# Patient Record
Sex: MALE | Race: BLACK OR AFRICAN AMERICAN | NOT HISPANIC OR LATINO | Employment: FULL TIME | URBAN - METROPOLITAN AREA
[De-identification: names, ages, dates, MRNs, and addresses within clinical notes are randomized per-mention and may not be internally consistent; named-entity substitution may affect disease eponyms.]

---

## 2019-07-29 RX ORDER — HYDROCHLOROTHIAZIDE 25 MG/1
TABLET ORAL
Qty: 90 TABLET | Refills: 1 | Status: SHIPPED | OUTPATIENT
Start: 2019-07-29 | End: 2020-02-06 | Stop reason: SDUPTHER

## 2019-08-26 PROBLEM — R00.1 SINUS BRADYCARDIA: Status: ACTIVE | Noted: 2018-08-28

## 2019-08-26 PROBLEM — I10 HTN (HYPERTENSION): Status: ACTIVE | Noted: 2019-08-26

## 2019-08-26 PROBLEM — R55 NEAR SYNCOPE: Status: ACTIVE | Noted: 2018-08-28

## 2019-12-02 ENCOUNTER — OFFICE VISIT (OUTPATIENT)
Dept: FAMILY MEDICINE CLINIC | Facility: CLINIC | Age: 59
End: 2019-12-02

## 2019-12-02 VITALS
DIASTOLIC BLOOD PRESSURE: 78 MMHG | WEIGHT: 184 LBS | BODY MASS INDEX: 27.89 KG/M2 | SYSTOLIC BLOOD PRESSURE: 123 MMHG | OXYGEN SATURATION: 98 % | HEIGHT: 68 IN | HEART RATE: 62 BPM

## 2019-12-02 DIAGNOSIS — S39.012A STRAIN OF LUMBAR REGION, INITIAL ENCOUNTER: Primary | ICD-10-CM

## 2019-12-02 PROBLEM — M62.830 MUSCLE SPASM OF BACK: Status: ACTIVE | Noted: 2019-12-02

## 2019-12-02 PROCEDURE — 99213 OFFICE O/P EST LOW 20 MIN: CPT | Performed by: NURSE PRACTITIONER

## 2019-12-02 RX ORDER — INFLUENZA A VIRUS A/SINGAPORE/GP1908/2015 IVR-180A (H1N1) ANTIGEN (PROPIOLACTONE INACTIVATED), INFLUENZA A VIRUS A/SINGAPORE/INFIMH-16-0019/2016 IVR-186 (H3N2) ANTIGEN (PROPIOLACTONE INACTIVATED), INFLUENZA B VIRUS B/MARYLAND/15/2016 ANTIGEN (PROPIOLACTONE INACTIVATED), AND INFLUENZA B VIRUS B/PHUKET/3073/2013 BVR-1B ANTIGEN (PROPIOLACTONE INACTIVATED) 15; 15; 15; 15 UG/.5ML; UG/.5ML; UG/.5ML; UG/.5ML
INJECTION, SUSPENSION INTRAMUSCULAR
Refills: 0 | COMMUNITY
Start: 2019-10-21 | End: 2021-02-25

## 2019-12-02 RX ORDER — IBUPROFEN 800 MG/1
800 TABLET ORAL EVERY 6 HOURS PRN
Qty: 90 TABLET | Refills: 0 | Status: SHIPPED | OUTPATIENT
Start: 2019-12-02 | End: 2021-02-25

## 2019-12-02 RX ORDER — CYCLOBENZAPRINE HCL 5 MG
5 TABLET ORAL 3 TIMES DAILY PRN
Qty: 20 TABLET | Refills: 0 | Status: SHIPPED | OUTPATIENT
Start: 2019-12-02 | End: 2021-02-25

## 2019-12-02 NOTE — PROGRESS NOTES
"  Sen Garcia is a 59 y.o. male.     Chief Complaint   Patient presents with   • lump on back     mid back       History of Present Illness pt presents for eval of L low back knot within the muscle, described as a marielena horse that happens at night intermittently for the last couple of months, 1-2 times/mo. Denies injury, tries to use proper body mechanics at work d/t an old lumbar/sacral injury, but reports this pain is different and not within the spine. Denies constipation, dysuria or hematuria      Subjective     Visit Vitals  /78 (BP Location: Left arm, Patient Position: Sitting, Cuff Size: Large Adult)   Pulse 62   Ht 172.7 cm (68\")   Wt 83.5 kg (184 lb)   SpO2 98%   BMI 27.98 kg/m²       The following portions of the patient's history were reviewed and updated as appropriate: allergies, current medications, past family history, past medical history, past social history, past surgical history and problem list.    Review of Systems   Musculoskeletal: Positive for back pain (knot to the left of low back in the muscle ).   All other systems reviewed and are negative.      Objective     Physical Exam   Constitutional: He is oriented to person, place, and time. He appears well-developed and well-nourished.   HENT:   Head: Normocephalic.   Pulmonary/Chest: Effort normal.   Abdominal: Soft.   Musculoskeletal: Normal range of motion. He exhibits tenderness (firm musculature and ttp in lat. dorsi left lateral to spine ).   Neurological: He is alert and oriented to person, place, and time.   Skin: Skin is warm and dry.   Psychiatric: He has a normal mood and affect. His behavior is normal.         Assessment/Plan   Sen was seen today for lump on back.    Diagnoses and all orders for this visit:    Strain of lumbar region, initial encounter    Other orders  -     ibuprofen (ADVIL,MOTRIN) 800 MG tablet; Take 1 tablet by mouth Every 6 (Six) Hours As Needed for Mild Pain .  -     cyclobenzaprine (FLEXERIL) 5 " MG tablet; Take 1 tablet by mouth 3 (Three) Times a Day As Needed for Muscle Spasms.    try ibuprofen, flexeril at hs, biofreeze, massage and ice to the left lat dorsi, given exercises/stretches to start as well. Return in 2-3 weeks if s/s wonb.                Glucose   Date Value Ref Range Status   07/10/2018 89 65 - 99 mg/dL Final     BUN   Date Value Ref Range Status   07/10/2018 13 8 - 20 mg/dL Final     Creatinine   Date Value Ref Range Status   07/10/2018 1.1 0.7 - 1.2 mg/dl Final     Sodium   Date Value Ref Range Status   07/10/2018 140 136 - 144 mmol/L Final     Potassium   Date Value Ref Range Status   07/10/2018 3.9 3.6 - 5.1 mmol/L Final     Chloride   Date Value Ref Range Status   07/10/2018 108 101 - 111 mmol/L Final     CO2   Date Value Ref Range Status   07/10/2018 28 22 - 32 mmol/L Final     Calcium   Date Value Ref Range Status   07/10/2018 8.9 8.9 - 10.3 mg/dL Final     BUN/Creatinine Ratio   Date Value Ref Range Status   07/10/2018 11.8 6.2 - 20.3 Final     Anion Gap   Date Value Ref Range Status   07/10/2018 7.9 (L) 10 - 20 Final

## 2019-12-02 NOTE — PATIENT INSTRUCTIONS
Back Exercises  The following exercises strengthen the muscles that help to support the back. They also help to keep the lower back flexible. Doing these exercises can help to prevent back pain or lessen existing pain.  If you have back pain or discomfort, try doing these exercises 2-3 times each day or as told by your health care provider. When the pain goes away, do them once each day, but increase the number of times that you repeat the steps for each exercise (do more repetitions). If you do not have back pain or discomfort, do these exercises once each day or as told by your health care provider.  Exercises  Single Knee to Chest  Repeat these steps 3-5 times for each le. Lie on your back on a firm bed or the floor with your legs extended.  2. Bring one knee to your chest. Your other leg should stay extended and in contact with the floor.  3. Hold your knee in place by grabbing your knee or thigh.  4. Pull on your knee until you feel a gentle stretch in your lower back.  5. Hold the stretch for 10-30 seconds.  6. Slowly release and straighten your leg.  Pelvic Tilt  Repeat these steps 5-10 times:  1. Lie on your back on a firm bed or the floor with your legs extended.  2. Bend your knees so they are pointing toward the ceiling and your feet are flat on the floor.  3. Tighten your lower abdominal muscles to press your lower back against the floor. This motion will tilt your pelvis so your tailbone points up toward the ceiling instead of pointing to your feet or the floor.  4. With gentle tension and even breathing, hold this position for 5-10 seconds.  Cat-Cow  Repeat these steps until your lower back becomes more flexible:  1. Get into a hands-and-knees position on a firm surface. Keep your hands under your shoulders, and keep your knees under your hips. You may place padding under your knees for comfort.  2. Let your head hang down, and point your tailbone toward the floor so your lower back becomes  rounded like the back of a cat.  3. Hold this position for 5 seconds.  4. Slowly lift your head and point your tailbone up toward the ceiling so your back forms a sagging arch like the back of a cow.  5. Hold this position for 5 seconds.    Press-Ups  Repeat these steps 5-10 times:  1. Lie on your abdomen (face-down) on the floor.  2. Place your palms near your head, about shoulder-width apart.  3. While you keep your back as relaxed as possible and keep your hips on the floor, slowly straighten your arms to raise the top half of your body and lift your shoulders. Do not use your back muscles to raise your upper torso. You may adjust the placement of your hands to make yourself more comfortable.  4. Hold this position for 5 seconds while you keep your back relaxed.  5. Slowly return to lying flat on the floor.    Bridges  Repeat these steps 10 times:  1. Lie on your back on a firm surface.  2. Bend your knees so they are pointing toward the ceiling and your feet are flat on the floor.  3. Tighten your buttocks muscles and lift your buttocks off of the floor until your waist is at almost the same height as your knees. You should feel the muscles working in your buttocks and the back of your thighs. If you do not feel these muscles, slide your feet 1-2 inches farther away from your buttocks.  4. Hold this position for 3-5 seconds.  5. Slowly lower your hips to the starting position, and allow your buttocks muscles to relax completely.  If this exercise is too easy, try doing it with your arms crossed over your chest.  Abdominal Crunches  Repeat these steps 5-10 times:  1. Lie on your back on a firm bed or the floor with your legs extended.  2. Bend your knees so they are pointing toward the ceiling and your feet are flat on the floor.  3. Cross your arms over your chest.  4. Tip your chin slightly toward your chest without bending your neck.  5. Tighten your abdominal muscles and slowly raise your trunk (torso) high  enough to lift your shoulder blades a tiny bit off of the floor. Avoid raising your torso higher than that, because it can put too much stress on your low back and it does not help to strengthen your abdominal muscles.  6. Slowly return to your starting position.  Back Lifts  Repeat these steps 5-10 times:  1. Lie on your abdomen (face-down) with your arms at your sides, and rest your forehead on the floor.  2. Tighten the muscles in your legs and your buttocks.  3. Slowly lift your chest off of the floor while you keep your hips pressed to the floor. Keep the back of your head in line with the curve in your back. Your eyes should be looking at the floor.  4. Hold this position for 3-5 seconds.  5. Slowly return to your starting position.  Contact a health care provider if:  · Your back pain or discomfort gets much worse when you do an exercise.  · Your back pain or discomfort does not lessen within 2 hours after you exercise.  If you have any of these problems, stop doing these exercises right away. Do not do them again unless your health care provider says that you can.  Get help right away if:  · You develop sudden, severe back pain. If this happens, stop doing the exercises right away. Do not do them again unless your health care provider says that you can.  This information is not intended to replace advice given to you by your health care provider. Make sure you discuss any questions you have with your health care provider.  Document Released: 01/25/2006 Document Revised: 04/23/2019 Document Reviewed: 02/11/2016  [employer] Patient Education © 2019 [employer]

## 2020-02-05 ENCOUNTER — TELEPHONE (OUTPATIENT)
Dept: FAMILY MEDICINE CLINIC | Facility: CLINIC | Age: 60
End: 2020-02-05

## 2020-02-05 NOTE — TELEPHONE ENCOUNTER
Patient came in and asked that you send his script of Hydrochlorathiazide to [facility]'s on [address]. The cost through mail order has went from under 3.00 to nearly 16.00 for a 90 day supply.

## 2020-02-06 RX ORDER — HYDROCHLOROTHIAZIDE 25 MG/1
25 TABLET ORAL DAILY
Qty: 90 TABLET | Refills: 1 | Status: SHIPPED | OUTPATIENT
Start: 2020-02-06 | End: 2020-09-29

## 2020-09-29 RX ORDER — HYDROCHLOROTHIAZIDE 25 MG/1
25 TABLET ORAL DAILY
Qty: 30 TABLET | Refills: 0 | Status: SHIPPED | OUTPATIENT
Start: 2020-09-29 | End: 2020-11-06

## 2020-11-06 RX ORDER — HYDROCHLOROTHIAZIDE 25 MG/1
25 TABLET ORAL DAILY
Qty: 30 TABLET | Refills: 0 | Status: SHIPPED | OUTPATIENT
Start: 2020-11-06 | End: 2023-03-29

## 2020-12-18 RX ORDER — HYDROCHLOROTHIAZIDE 25 MG/1
25 TABLET ORAL DAILY
Qty: 30 TABLET | Refills: 0 | OUTPATIENT
Start: 2020-12-18

## 2021-02-25 PROCEDURE — U0003 INFECTIOUS AGENT DETECTION BY NUCLEIC ACID (DNA OR RNA); SEVERE ACUTE RESPIRATORY SYNDROME CORONAVIRUS 2 (SARS-COV-2) (CORONAVIRUS DISEASE [COVID-19]), AMPLIFIED PROBE TECHNIQUE, MAKING USE OF HIGH THROUGHPUT TECHNOLOGIES AS DESCRIBED BY CMS-2020-01-R: HCPCS | Performed by: NURSE PRACTITIONER

## 2021-02-28 ENCOUNTER — HOSPITAL ENCOUNTER (EMERGENCY)
Facility: HOSPITAL | Age: 61
Discharge: HOME | End: 2021-02-28
Attending: EMERGENCY MEDICINE | Admitting: EMERGENCY MEDICINE

## 2021-02-28 ENCOUNTER — APPOINTMENT (OUTPATIENT)
Dept: CT IMAGING | Facility: HOSPITAL | Age: 61
End: 2021-02-28

## 2021-02-28 VITALS
HEART RATE: 64 BPM | SYSTOLIC BLOOD PRESSURE: 115 MMHG | BODY MASS INDEX: 28.37 KG/M2 | RESPIRATION RATE: 16 BRPM | WEIGHT: 180.78 LBS | OXYGEN SATURATION: 98 % | HEIGHT: 67 IN | TEMPERATURE: 98.4 F | DIASTOLIC BLOOD PRESSURE: 68 MMHG

## 2021-02-28 DIAGNOSIS — K57.92 ACUTE DIVERTICULITIS: ICD-10-CM

## 2021-02-28 DIAGNOSIS — R10.84 GENERALIZED ABDOMINAL PAIN: Primary | ICD-10-CM

## 2021-02-28 LAB
ANION GAP SERPL CALCULATED.3IONS-SCNC: 10 MMOL/L (ref 5–15)
BACTERIA UR QL AUTO: ABNORMAL /HPF
BASOPHILS # BLD AUTO: 0 10*3/MM3 (ref 0–0.2)
BASOPHILS NFR BLD AUTO: 0.6 % (ref 0–1.5)
BILIRUB UR QL STRIP: NEGATIVE
BUN SERPL-MCNC: 12 MG/DL (ref 6–20)
BUN/CREAT SERPL: 10.1 (ref 7–25)
CALCIUM SPEC-SCNC: 8.9 MG/DL (ref 8.6–10.5)
CHLORIDE SERPL-SCNC: 99 MMOL/L (ref 98–107)
CLARITY UR: CLEAR
CO2 SERPL-SCNC: 31 MMOL/L (ref 22–29)
COLOR UR: YELLOW
CREAT SERPL-MCNC: 1.19 MG/DL (ref 0.76–1.27)
DEPRECATED RDW RBC AUTO: 39.4 FL (ref 37–54)
EOSINOPHIL # BLD AUTO: 0 10*3/MM3 (ref 0–0.4)
EOSINOPHIL NFR BLD AUTO: 0.3 % (ref 0.3–6.2)
ERYTHROCYTE [DISTWIDTH] IN BLOOD BY AUTOMATED COUNT: 12.7 % (ref 12.3–15.4)
GFR SERPL CREATININE-BSD FRML MDRD: 76 ML/MIN/1.73
GLUCOSE SERPL-MCNC: 110 MG/DL (ref 65–99)
GLUCOSE UR STRIP-MCNC: NEGATIVE MG/DL
HCT VFR BLD AUTO: 43.4 % (ref 37.5–51)
HGB BLD-MCNC: 15.2 G/DL (ref 13–17.7)
HGB UR QL STRIP.AUTO: ABNORMAL
HOLD SPECIMEN: NORMAL
HOLD SPECIMEN: NORMAL
HYALINE CASTS UR QL AUTO: ABNORMAL /LPF
KETONES UR QL STRIP: ABNORMAL
LEUKOCYTE ESTERASE UR QL STRIP.AUTO: NEGATIVE
LYMPHOCYTES # BLD AUTO: 1.5 10*3/MM3 (ref 0.7–3.1)
LYMPHOCYTES NFR BLD AUTO: 27 % (ref 19.6–45.3)
MCH RBC QN AUTO: 31.1 PG (ref 26.6–33)
MCHC RBC AUTO-ENTMCNC: 35 G/DL (ref 31.5–35.7)
MCV RBC AUTO: 88.8 FL (ref 79–97)
MONOCYTES # BLD AUTO: 0.7 10*3/MM3 (ref 0.1–0.9)
MONOCYTES NFR BLD AUTO: 12 % (ref 5–12)
NEUTROPHILS NFR BLD AUTO: 3.4 10*3/MM3 (ref 1.7–7)
NEUTROPHILS NFR BLD AUTO: 60.1 % (ref 42.7–76)
NITRITE UR QL STRIP: NEGATIVE
NRBC BLD AUTO-RTO: 0.1 /100 WBC (ref 0–0.2)
PH UR STRIP.AUTO: 6.5 [PH] (ref 5–8)
PLATELET # BLD AUTO: 233 10*3/MM3 (ref 140–450)
PMV BLD AUTO: 8.8 FL (ref 6–12)
POTASSIUM SERPL-SCNC: 3.4 MMOL/L (ref 3.5–5.2)
PROT UR QL STRIP: ABNORMAL
RBC # BLD AUTO: 4.89 10*6/MM3 (ref 4.14–5.8)
RBC # UR: ABNORMAL /HPF
REF LAB TEST METHOD: ABNORMAL
SODIUM SERPL-SCNC: 140 MMOL/L (ref 136–145)
SP GR UR STRIP: 1.03 (ref 1–1.03)
SQUAMOUS #/AREA URNS HPF: ABNORMAL /HPF
UROBILINOGEN UR QL STRIP: ABNORMAL
WBC # BLD AUTO: 5.7 10*3/MM3 (ref 3.4–10.8)
WBC UR QL AUTO: ABNORMAL /HPF
WHOLE BLOOD HOLD SPECIMEN: NORMAL

## 2021-02-28 PROCEDURE — 81001 URINALYSIS AUTO W/SCOPE: CPT | Performed by: EMERGENCY MEDICINE

## 2021-02-28 PROCEDURE — 80048 BASIC METABOLIC PNL TOTAL CA: CPT | Performed by: EMERGENCY MEDICINE

## 2021-02-28 PROCEDURE — 74176 CT ABD & PELVIS W/O CONTRAST: CPT

## 2021-02-28 PROCEDURE — 99283 EMERGENCY DEPT VISIT LOW MDM: CPT

## 2021-02-28 PROCEDURE — 85025 COMPLETE CBC W/AUTO DIFF WBC: CPT | Performed by: EMERGENCY MEDICINE

## 2021-02-28 RX ORDER — SODIUM CHLORIDE 0.9 % (FLUSH) 0.9 %
10 SYRINGE (ML) INJECTION AS NEEDED
Status: DISCONTINUED | OUTPATIENT
Start: 2021-02-28 | End: 2021-02-28 | Stop reason: HOSPADM

## 2021-02-28 RX ORDER — AMOXICILLIN AND CLAVULANATE POTASSIUM 875; 125 MG/1; MG/1
1 TABLET, FILM COATED ORAL 2 TIMES DAILY
Qty: 14 TABLET | Refills: 0 | Status: SHIPPED | OUTPATIENT
Start: 2021-02-28 | End: 2023-03-29

## 2021-02-28 RX ORDER — NAPROXEN 375 MG/1
375 TABLET ORAL 2 TIMES DAILY PRN
Qty: 14 TABLET | Refills: 0 | Status: SHIPPED | OUTPATIENT
Start: 2021-02-28 | End: 2023-03-29

## 2021-03-01 NOTE — ED PROVIDER NOTES
Subjective   Patient is a 60-year-old male complaint of several day history of lower abdominal pain.  The pain is mild to moderate but constant.  He denies fever balm diarrhea dysuria or other complaint.          Review of Systems  Negative for headache earache sore throat cough fever chest pain shortness of breath vomiting diarrhea dysuria achiness weight loss or other complaint.  Past Medical History:   Diagnosis Date   • HTN (hypertension)    • Near syncope    • Sinus bradycardia        No Known Allergies    Past Surgical History:   Procedure Laterality Date   • HAND SURGERY Left    • KNEE ARTHROSCOPY Right        Family History   Problem Relation Age of Onset   • Diabetes Mother        Social History     Socioeconomic History   • Marital status:      Spouse name: Not on file   • Number of children: Not on file   • Years of education: Not on file   • Highest education level: Not on file   Tobacco Use   • Smoking status: Never Smoker   • Smokeless tobacco: Never Used   Substance and Sexual Activity   • Alcohol use: No     Frequency: Never   • Drug use: No   • Sexual activity: Defer           Objective   Physical Exam  HEENT exam shows TMs to be clear.  Oropharynx clear moist but sclera nonicteric.  Neck has no adenopathy JVD or bruits.  Lungs are clear.  Heart has regular rate rhythm without murmur gallop.  Chest is nontender.  Abdomen soft with minimal infraumbilical tenderness.  Patient has normal bowel sounds without rebound or guarding.  Back has no CVA tenderness.  Procedures           ED Course      Results for orders placed or performed during the hospital encounter of 02/28/21   Basic Metabolic Panel    Specimen: Blood   Result Value Ref Range    Glucose 110 (H) 65 - 99 mg/dL    BUN 12 6 - 20 mg/dL    Creatinine 1.19 0.76 - 1.27 mg/dL    Sodium 140 136 - 145 mmol/L    Potassium 3.4 (L) 3.5 - 5.2 mmol/L    Chloride 99 98 - 107 mmol/L    CO2 31.0 (H) 22.0 - 29.0 mmol/L    Calcium 8.9 8.6 - 10.5 mg/dL     eGFR  African Amer 76 >60 mL/min/1.73    BUN/Creatinine Ratio 10.1 7.0 - 25.0    Anion Gap 10.0 5.0 - 15.0 mmol/L   Urinalysis With Culture If Indicated - Urine, Clean Catch    Specimen: Urine, Clean Catch   Result Value Ref Range    Color, UA Yellow Yellow, Straw    Appearance, UA Clear Clear    pH, UA 6.5 5.0 - 8.0    Specific Gravity, UA 1.029 1.005 - 1.030    Glucose, UA Negative Negative    Ketones, UA Trace (A) Negative    Bilirubin, UA Negative Negative    Blood, UA Small (1+) (A) Negative    Protein, UA 30 mg/dL (1+) (A) Negative    Leuk Esterase, UA Negative Negative    Nitrite, UA Negative Negative    Urobilinogen, UA 1.0 E.U./dL 0.2 - 1.0 E.U./dL   CBC Auto Differential    Specimen: Blood   Result Value Ref Range    WBC 5.70 3.40 - 10.80 10*3/mm3    RBC 4.89 4.14 - 5.80 10*6/mm3    Hemoglobin 15.2 13.0 - 17.7 g/dL    Hematocrit 43.4 37.5 - 51.0 %    MCV 88.8 79.0 - 97.0 fL    MCH 31.1 26.6 - 33.0 pg    MCHC 35.0 31.5 - 35.7 g/dL    RDW 12.7 12.3 - 15.4 %    RDW-SD 39.4 37.0 - 54.0 fl    MPV 8.8 6.0 - 12.0 fL    Platelets 233 140 - 450 10*3/mm3    Neutrophil % 60.1 42.7 - 76.0 %    Lymphocyte % 27.0 19.6 - 45.3 %    Monocyte % 12.0 5.0 - 12.0 %    Eosinophil % 0.3 0.3 - 6.2 %    Basophil % 0.6 0.0 - 1.5 %    Neutrophils, Absolute 3.40 1.70 - 7.00 10*3/mm3    Lymphocytes, Absolute 1.50 0.70 - 3.10 10*3/mm3    Monocytes, Absolute 0.70 0.10 - 0.90 10*3/mm3    Eosinophils, Absolute 0.00 0.00 - 0.40 10*3/mm3    Basophils, Absolute 0.00 0.00 - 0.20 10*3/mm3    nRBC 0.1 0.0 - 0.2 /100 WBC   Urinalysis, Microscopic Only - Urine, Clean Catch    Specimen: Urine, Clean Catch   Result Value Ref Range    RBC, UA None Seen None Seen /HPF    WBC, UA 3-5 (A) None Seen /HPF    Bacteria, UA None Seen None Seen /HPF    Squamous Epithelial Cells, UA 3-6 (A) None Seen, 0-2 /HPF    Hyaline Casts, UA None Seen None Seen /LPF    Methodology Manual Light Microscopy    Light Blue Top   Result Value Ref Range    Extra Tube hold  for add-on    Gold Top - SST   Result Value Ref Range    Extra Tube Hold for add-ons.    Green Top (Gel)   Result Value Ref Range    Extra Tube Hold for add-ons.      Ct Abdomen Pelvis Without Contrast    Result Date: 2/28/2021  1.Patchy left lower lobe infiltrate consistent with pneumonia. 2.Fatty stranding around the proximal sigmoid colon where there are several prominent diverticula consistent with uncomplicated acute diverticulitis. Close follow-up recommended to ensure there is no underlying malignant process.    Electronically Signed By-Fortino Luo MD On:2/28/2021 7:54 PM This report was finalized on [id number] by  Fortino Luo MD.                                         MDM  Number of Diagnoses or Management Options  Diagnosis management comments: She has findings consistent with acute diverticulitis.  There is no evidence of other intra-abdominal abnormality.  Metabolic panel is at baseline.  There is no evidence of other infectious process.  Patient will be discharged.  Patient will be placed on Augmentin and Naprosyn.  We will see his MD for recheck.       Amount and/or Complexity of Data Reviewed  Clinical lab tests: reviewed  Tests in the radiology section of CPT®: reviewed    Risk of Complications, Morbidity, and/or Mortality  Presenting problems: high  Diagnostic procedures: high  Management options: high    Patient Progress  Patient progress: stable      Final diagnoses:   Generalized abdominal pain   Acute diverticulitis            Fortino Gutierrez MD  02/28/21 2014

## 2021-04-07 ENCOUNTER — HOSPITAL ENCOUNTER (OUTPATIENT)
Dept: CARDIOLOGY | Facility: HOSPITAL | Age: 61
Discharge: HOME | End: 2021-04-07

## 2021-04-07 ENCOUNTER — LAB (OUTPATIENT)
Dept: LAB | Facility: HOSPITAL | Age: 61
End: 2021-04-07

## 2021-04-07 ENCOUNTER — TRANSCRIBE ORDERS (OUTPATIENT)
Dept: ADMINISTRATIVE | Facility: HOSPITAL | Age: 61
End: 2021-04-07

## 2021-04-07 DIAGNOSIS — Z01.818 PREOP TESTING: ICD-10-CM

## 2021-04-07 DIAGNOSIS — D49.4 BLADDER TUMOR: ICD-10-CM

## 2021-04-07 DIAGNOSIS — D49.4 BLADDER TUMOR: Primary | ICD-10-CM

## 2021-04-07 LAB
ANION GAP SERPL CALCULATED.3IONS-SCNC: 8.9 MMOL/L (ref 5–15)
BASOPHILS # BLD AUTO: 0.05 10*3/MM3 (ref 0–0.2)
BASOPHILS NFR BLD AUTO: 1.2 % (ref 0–1.5)
BUN SERPL-MCNC: 12 MG/DL (ref 6–20)
BUN/CREAT SERPL: 11.2 (ref 7–25)
CALCIUM SPEC-SCNC: 9.3 MG/DL (ref 8.6–10.5)
CHLORIDE SERPL-SCNC: 102 MMOL/L (ref 98–107)
CO2 SERPL-SCNC: 30.1 MMOL/L (ref 22–29)
CREAT SERPL-MCNC: 1.07 MG/DL (ref 0.76–1.27)
DEPRECATED RDW RBC AUTO: 43.3 FL (ref 37–54)
EOSINOPHIL # BLD AUTO: 0.1 10*3/MM3 (ref 0–0.4)
EOSINOPHIL NFR BLD AUTO: 2.4 % (ref 0.3–6.2)
ERYTHROCYTE [DISTWIDTH] IN BLOOD BY AUTOMATED COUNT: 13.3 % (ref 12.3–15.4)
GFR SERPL CREATININE-BSD FRML MDRD: 86 ML/MIN/1.73
GLUCOSE SERPL-MCNC: 99 MG/DL (ref 65–99)
HCT VFR BLD AUTO: 40.7 % (ref 37.5–51)
HGB BLD-MCNC: 13.9 G/DL (ref 13–17.7)
IMM GRANULOCYTES # BLD AUTO: 0.01 10*3/MM3 (ref 0–0.05)
IMM GRANULOCYTES NFR BLD AUTO: 0.2 % (ref 0–0.5)
LYMPHOCYTES # BLD AUTO: 1.71 10*3/MM3 (ref 0.7–3.1)
LYMPHOCYTES NFR BLD AUTO: 40.8 % (ref 19.6–45.3)
MCH RBC QN AUTO: 30.4 PG (ref 26.6–33)
MCHC RBC AUTO-ENTMCNC: 34.2 G/DL (ref 31.5–35.7)
MCV RBC AUTO: 89.1 FL (ref 79–97)
MONOCYTES # BLD AUTO: 0.39 10*3/MM3 (ref 0.1–0.9)
MONOCYTES NFR BLD AUTO: 9.3 % (ref 5–12)
NEUTROPHILS NFR BLD AUTO: 1.93 10*3/MM3 (ref 1.7–7)
NEUTROPHILS NFR BLD AUTO: 46.1 % (ref 42.7–76)
NRBC BLD AUTO-RTO: 0 /100 WBC (ref 0–0.2)
PLATELET # BLD AUTO: 213 10*3/MM3 (ref 140–450)
PMV BLD AUTO: 12.4 FL (ref 6–12)
POTASSIUM SERPL-SCNC: 3.5 MMOL/L (ref 3.5–5.2)
RBC # BLD AUTO: 4.57 10*6/MM3 (ref 4.14–5.8)
SARS-COV-2 ORF1AB RESP QL NAA+PROBE: NOT DETECTED
SODIUM SERPL-SCNC: 141 MMOL/L (ref 136–145)
WBC # BLD AUTO: 4.19 10*3/MM3 (ref 3.4–10.8)

## 2021-04-07 PROCEDURE — 36415 COLL VENOUS BLD VENIPUNCTURE: CPT

## 2021-04-07 PROCEDURE — U0004 COV-19 TEST NON-CDC HGH THRU: HCPCS

## 2021-04-07 PROCEDURE — 93010 ELECTROCARDIOGRAM REPORT: CPT | Performed by: INTERNAL MEDICINE

## 2021-04-07 PROCEDURE — C9803 HOPD COVID-19 SPEC COLLECT: HCPCS

## 2021-04-07 PROCEDURE — 85025 COMPLETE CBC W/AUTO DIFF WBC: CPT

## 2021-04-07 PROCEDURE — 93005 ELECTROCARDIOGRAM TRACING: CPT | Performed by: UROLOGY

## 2021-04-07 PROCEDURE — 80048 BASIC METABOLIC PNL TOTAL CA: CPT

## 2021-04-13 PROCEDURE — 88305 TISSUE EXAM BY PATHOLOGIST: CPT | Performed by: UROLOGY

## 2021-04-14 ENCOUNTER — LAB REQUISITION (OUTPATIENT)
Dept: LAB | Facility: HOSPITAL | Age: 61
End: 2021-04-14

## 2021-04-14 DIAGNOSIS — N32.9 BLADDER DISORDER, UNSPECIFIED: ICD-10-CM

## 2021-04-15 LAB
LAB AP CASE REPORT: NORMAL
PATH REPORT.FINAL DX SPEC: NORMAL
PATH REPORT.GROSS SPEC: NORMAL

## 2021-04-16 LAB — QT INTERVAL: 409 MS

## 2021-04-28 ENCOUNTER — OFFICE (AMBULATORY)
Dept: URBAN - METROPOLITAN AREA CLINIC 64 | Facility: CLINIC | Age: 61
End: 2021-04-28
Payer: COMMERCIAL

## 2021-04-28 VITALS
SYSTOLIC BLOOD PRESSURE: 115 MMHG | WEIGHT: 180 LBS | HEART RATE: 58 BPM | DIASTOLIC BLOOD PRESSURE: 61 MMHG | HEIGHT: 67 IN

## 2021-04-28 DIAGNOSIS — K57.32 DIVERTICULITIS OF LARGE INTESTINE WITHOUT PERFORATION OR ABS: ICD-10-CM

## 2021-04-28 PROCEDURE — 99204 OFFICE O/P NEW MOD 45 MIN: CPT | Performed by: INTERNAL MEDICINE

## 2021-04-28 RX ORDER — POLYETHYLENE GLYCOL 3350 17 G/17G
POWDER, FOR SOLUTION ORAL
Qty: 3 | Refills: 3 | Status: COMPLETED
Start: 2021-04-28 | End: 2021-06-08

## 2021-06-09 ENCOUNTER — ON CAMPUS - OUTPATIENT (AMBULATORY)
Dept: URBAN - METROPOLITAN AREA HOSPITAL 2 | Facility: HOSPITAL | Age: 61
End: 2021-06-09

## 2021-06-09 VITALS
HEART RATE: 64 BPM | DIASTOLIC BLOOD PRESSURE: 54 MMHG | SYSTOLIC BLOOD PRESSURE: 125 MMHG | HEIGHT: 67 IN | SYSTOLIC BLOOD PRESSURE: 98 MMHG | HEART RATE: 66 BPM | HEART RATE: 54 BPM | DIASTOLIC BLOOD PRESSURE: 61 MMHG | RESPIRATION RATE: 18 BRPM | DIASTOLIC BLOOD PRESSURE: 98 MMHG | SYSTOLIC BLOOD PRESSURE: 103 MMHG | DIASTOLIC BLOOD PRESSURE: 74 MMHG | SYSTOLIC BLOOD PRESSURE: 130 MMHG | OXYGEN SATURATION: 100 % | DIASTOLIC BLOOD PRESSURE: 62 MMHG | HEART RATE: 65 BPM | HEART RATE: 47 BPM | SYSTOLIC BLOOD PRESSURE: 95 MMHG | DIASTOLIC BLOOD PRESSURE: 72 MMHG | SYSTOLIC BLOOD PRESSURE: 129 MMHG | SYSTOLIC BLOOD PRESSURE: 113 MMHG | RESPIRATION RATE: 16 BRPM | SYSTOLIC BLOOD PRESSURE: 154 MMHG | WEIGHT: 174 LBS | DIASTOLIC BLOOD PRESSURE: 79 MMHG | TEMPERATURE: 97.7 F | HEART RATE: 52 BPM

## 2021-06-09 DIAGNOSIS — K57.92 DIVERTICULITIS OF INTESTINE, PART UNSPECIFIED, WITHOUT PERFO: ICD-10-CM

## 2021-06-09 DIAGNOSIS — R10.32 LEFT LOWER QUADRANT PAIN: ICD-10-CM

## 2021-06-09 DIAGNOSIS — K57.30 DIVERTICULOSIS OF LARGE INTESTINE WITHOUT PERFORATION OR ABS: ICD-10-CM

## 2021-06-09 DIAGNOSIS — K64.1 SECOND DEGREE HEMORRHOIDS: ICD-10-CM

## 2021-06-09 PROCEDURE — 45378 DIAGNOSTIC COLONOSCOPY: CPT | Mod: 33 | Performed by: INTERNAL MEDICINE

## 2022-05-17 ENCOUNTER — LAB (OUTPATIENT)
Dept: LAB | Facility: HOSPITAL | Age: 62
End: 2022-05-17

## 2022-05-17 ENCOUNTER — TRANSCRIBE ORDERS (OUTPATIENT)
Dept: ADMINISTRATIVE | Facility: HOSPITAL | Age: 62
End: 2022-05-17

## 2022-05-17 ENCOUNTER — HOSPITAL ENCOUNTER (OUTPATIENT)
Dept: CARDIOLOGY | Facility: HOSPITAL | Age: 62
Discharge: HOME | End: 2022-05-17

## 2022-05-17 DIAGNOSIS — Z01.818 PRE-OP TESTING: ICD-10-CM

## 2022-05-17 DIAGNOSIS — Z01.818 PRE-OP TESTING: Primary | ICD-10-CM

## 2022-05-17 LAB
ANION GAP SERPL CALCULATED.3IONS-SCNC: 9.2 MMOL/L (ref 5–15)
BASOPHILS # BLD AUTO: 0.02 10*3/MM3 (ref 0–0.2)
BASOPHILS NFR BLD AUTO: 0.5 % (ref 0–1.5)
BUN SERPL-MCNC: 11 MG/DL (ref 6–20)
BUN/CREAT SERPL: 10.6 (ref 7–25)
CALCIUM SPEC-SCNC: 9.5 MG/DL (ref 8.6–10.5)
CHLORIDE SERPL-SCNC: 101 MMOL/L (ref 98–107)
CO2 SERPL-SCNC: 29.8 MMOL/L (ref 22–29)
CREAT SERPL-MCNC: 1.04 MG/DL (ref 0.76–1.27)
DEPRECATED RDW RBC AUTO: 38.8 FL (ref 37–54)
EGFRCR SERPLBLD CKD-EPI 2021: 82.7 ML/MIN/1.73
EOSINOPHIL # BLD AUTO: 0.05 10*3/MM3 (ref 0–0.4)
EOSINOPHIL NFR BLD AUTO: 1.1 % (ref 0.3–6.2)
ERYTHROCYTE [DISTWIDTH] IN BLOOD BY AUTOMATED COUNT: 12.5 % (ref 12.3–15.4)
GLUCOSE SERPL-MCNC: 86 MG/DL (ref 65–99)
HCT VFR BLD AUTO: 39.2 % (ref 37.5–51)
HGB BLD-MCNC: 13.7 G/DL (ref 13–17.7)
IMM GRANULOCYTES # BLD AUTO: 0.02 10*3/MM3 (ref 0–0.05)
IMM GRANULOCYTES NFR BLD AUTO: 0.5 % (ref 0–0.5)
LYMPHOCYTES # BLD AUTO: 1.71 10*3/MM3 (ref 0.7–3.1)
LYMPHOCYTES NFR BLD AUTO: 39 % (ref 19.6–45.3)
MCH RBC QN AUTO: 30.5 PG (ref 26.6–33)
MCHC RBC AUTO-ENTMCNC: 34.9 G/DL (ref 31.5–35.7)
MCV RBC AUTO: 87.3 FL (ref 79–97)
MONOCYTES # BLD AUTO: 0.38 10*3/MM3 (ref 0.1–0.9)
MONOCYTES NFR BLD AUTO: 8.7 % (ref 5–12)
NEUTROPHILS NFR BLD AUTO: 2.21 10*3/MM3 (ref 1.7–7)
NEUTROPHILS NFR BLD AUTO: 50.2 % (ref 42.7–76)
NRBC BLD AUTO-RTO: 0 /100 WBC (ref 0–0.2)
PLATELET # BLD AUTO: 220 10*3/MM3 (ref 140–450)
PMV BLD AUTO: 11.8 FL (ref 6–12)
POTASSIUM SERPL-SCNC: 3.8 MMOL/L (ref 3.5–5.2)
RBC # BLD AUTO: 4.49 10*6/MM3 (ref 4.14–5.8)
SODIUM SERPL-SCNC: 140 MMOL/L (ref 136–145)
WBC NRBC COR # BLD: 4.39 10*3/MM3 (ref 3.4–10.8)

## 2022-05-17 PROCEDURE — 36415 COLL VENOUS BLD VENIPUNCTURE: CPT

## 2022-05-17 PROCEDURE — 93010 ELECTROCARDIOGRAM REPORT: CPT | Performed by: INTERNAL MEDICINE

## 2022-05-17 PROCEDURE — 93005 ELECTROCARDIOGRAM TRACING: CPT | Performed by: UROLOGY

## 2022-05-17 PROCEDURE — 80048 BASIC METABOLIC PNL TOTAL CA: CPT

## 2022-05-17 PROCEDURE — 85025 COMPLETE CBC W/AUTO DIFF WBC: CPT

## 2022-05-19 LAB — QT INTERVAL: 424 MS

## 2022-05-24 PROCEDURE — 88305 TISSUE EXAM BY PATHOLOGIST: CPT | Performed by: UROLOGY

## 2022-05-25 ENCOUNTER — LAB REQUISITION (OUTPATIENT)
Dept: LAB | Facility: HOSPITAL | Age: 62
End: 2022-05-25

## 2022-05-25 DIAGNOSIS — N32.9 BLADDER DISORDER, UNSPECIFIED: ICD-10-CM

## 2022-05-26 LAB
LAB AP CASE REPORT: NORMAL
PATH REPORT.FINAL DX SPEC: NORMAL
PATH REPORT.GROSS SPEC: NORMAL

## 2023-03-29 ENCOUNTER — CONSULT (OUTPATIENT)
Dept: ONCOLOGY | Facility: CLINIC | Age: 63
End: 2023-03-29
Payer: COMMERCIAL

## 2023-03-29 ENCOUNTER — LAB (OUTPATIENT)
Dept: LAB | Facility: HOSPITAL | Age: 63
End: 2023-03-29
Payer: COMMERCIAL

## 2023-03-29 VITALS
WEIGHT: 184 LBS | DIASTOLIC BLOOD PRESSURE: 90 MMHG | RESPIRATION RATE: 18 BRPM | HEART RATE: 46 BPM | HEIGHT: 67 IN | BODY MASS INDEX: 28.88 KG/M2 | TEMPERATURE: 97 F | SYSTOLIC BLOOD PRESSURE: 174 MMHG

## 2023-03-29 DIAGNOSIS — C64.9 RENAL CELL CARCINOMA, UNSPECIFIED LATERALITY: Primary | ICD-10-CM

## 2023-03-29 DIAGNOSIS — N32.9 BLADDER DISORDER, UNSPECIFIED: Primary | ICD-10-CM

## 2023-03-29 DIAGNOSIS — C64.9 RENAL CELL CARCINOMA, UNSPECIFIED LATERALITY: ICD-10-CM

## 2023-03-29 PROBLEM — N28.89 RENAL MASS: Status: ACTIVE | Noted: 2023-01-19

## 2023-03-29 LAB
ALBUMIN SERPL-MCNC: 4.1 G/DL (ref 3.5–5.2)
ALBUMIN/GLOB SERPL: 1.5 G/DL
ALP SERPL-CCNC: 66 U/L (ref 39–117)
ALT SERPL W P-5'-P-CCNC: 18 U/L (ref 1–41)
ANION GAP SERPL CALCULATED.3IONS-SCNC: 8 MMOL/L (ref 5–15)
AST SERPL-CCNC: 23 U/L (ref 1–40)
BASOPHILS # BLD AUTO: 0.03 10*3/MM3 (ref 0–0.2)
BASOPHILS NFR BLD AUTO: 0.7 % (ref 0–1.5)
BILIRUB SERPL-MCNC: 0.5 MG/DL (ref 0–1.2)
BUN SERPL-MCNC: 10 MG/DL (ref 8–23)
BUN/CREAT SERPL: 9.3 (ref 7–25)
CALCIUM SPEC-SCNC: 9.4 MG/DL (ref 8.6–10.5)
CHLORIDE SERPL-SCNC: 104 MMOL/L (ref 98–107)
CO2 SERPL-SCNC: 29 MMOL/L (ref 22–29)
CREAT SERPL-MCNC: 1.08 MG/DL (ref 0.76–1.27)
DEPRECATED RDW RBC AUTO: 38 FL (ref 37–54)
EGFRCR SERPLBLD CKD-EPI 2021: 78.6 ML/MIN/1.73
EOSINOPHIL # BLD AUTO: 0.07 10*3/MM3 (ref 0–0.4)
EOSINOPHIL NFR BLD AUTO: 1.7 % (ref 0.3–6.2)
ERYTHROCYTE [DISTWIDTH] IN BLOOD BY AUTOMATED COUNT: 12.1 % (ref 12.3–15.4)
GLOBULIN UR ELPH-MCNC: 2.8 GM/DL
GLUCOSE SERPL-MCNC: 96 MG/DL (ref 65–99)
HCT VFR BLD AUTO: 39 % (ref 37.5–51)
HGB BLD-MCNC: 13.6 G/DL (ref 13–17.7)
HOLD SPECIMEN: NORMAL
HOLD SPECIMEN: NORMAL
LYMPHOCYTES # BLD AUTO: 1.84 10*3/MM3 (ref 0.7–3.1)
LYMPHOCYTES NFR BLD AUTO: 45.1 % (ref 19.6–45.3)
MCH RBC QN AUTO: 30.4 PG (ref 26.6–33)
MCHC RBC AUTO-ENTMCNC: 34.9 G/DL (ref 31.5–35.7)
MCV RBC AUTO: 87.1 FL (ref 79–97)
MONOCYTES # BLD AUTO: 0.42 10*3/MM3 (ref 0.1–0.9)
MONOCYTES NFR BLD AUTO: 10.3 % (ref 5–12)
NEUTROPHILS NFR BLD AUTO: 1.72 10*3/MM3 (ref 1.7–7)
NEUTROPHILS NFR BLD AUTO: 42.2 % (ref 42.7–76)
PLATELET # BLD AUTO: 224 10*3/MM3 (ref 140–450)
PMV BLD AUTO: 10.6 FL (ref 6–12)
POTASSIUM SERPL-SCNC: 4.4 MMOL/L (ref 3.5–5.2)
PROT SERPL-MCNC: 6.9 G/DL (ref 6–8.5)
RBC # BLD AUTO: 4.48 10*6/MM3 (ref 4.14–5.8)
SODIUM SERPL-SCNC: 141 MMOL/L (ref 136–145)
WBC NRBC COR # BLD: 4.08 10*3/MM3 (ref 3.4–10.8)

## 2023-03-29 PROCEDURE — 99205 OFFICE O/P NEW HI 60 MIN: CPT | Performed by: INTERNAL MEDICINE

## 2023-03-29 PROCEDURE — 80053 COMPREHEN METABOLIC PANEL: CPT | Performed by: INTERNAL MEDICINE

## 2023-03-29 PROCEDURE — 36415 COLL VENOUS BLD VENIPUNCTURE: CPT

## 2023-03-29 PROCEDURE — 85025 COMPLETE CBC W/AUTO DIFF WBC: CPT

## 2023-03-29 RX ORDER — DESMOPRESSIN ACETATE 0.2 MG/1
TABLET ORAL
COMMUNITY
Start: 2023-03-23

## 2023-03-29 RX ORDER — LISINOPRIL 10 MG/1
1 TABLET ORAL DAILY
COMMUNITY
Start: 2023-03-06

## 2023-03-29 RX ORDER — HYDROCODONE BITARTRATE AND ACETAMINOPHEN 5; 325 MG/1; MG/1
TABLET ORAL
COMMUNITY
Start: 2023-01-19 | End: 2023-03-29

## 2023-03-29 NOTE — PROGRESS NOTES
Hematology/Oncology Outpatient Consultation    Patient name: Sen Garcia  : 1960  MRN: [id number]  Primary Care Physician: Hilaria Kimble NP-C  Referring Physician: Hilaria Kimble NP-C  Reason For Consult:     Chief Complaint   Patient presents with   • Appointment     Renal cell carcinoma       History of Present Illness:      This is a 62-year-old male who has referred secondary to kidney cancer.  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  In addition there was a partially exophytic hypodense lesion at the medial lower left pole measuring 1.2 cm.  There was evidence of prostatic enlargement measuring up to 4.7 cm otherwise there was no evidence of metastatic disease in the abdomen and pelvis.  The left lower pole 1.2 cm enhancing renal mass also shows for renal cell carcinoma with consideration for possible papillary or chromophobe subtype as seen patient then underwent a partial left nephrectomy performed by his urologist Dr. Luis Manuel Carmen on 2023.  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  Tumor was confined to renal parenchyma.  There was tumor involving the parenchymal margin.  Pathology stage is pT1a pNX M0    Scheduled for follow-up in first week in May 2023 during which time he will have CT scan performed by Dr. Luis Manuel Carmen.    He has recovered from his surgery.  He feels well and denies any abdominal pain, nausea vomiting or weight loss.    Patient has a history of superficial bladder cancer for which she will also be seen by Dr. Carmen in the near future.    He does not smoke and there is no family history of any malignancies.    Patient is     Patient works on cars      Past Medical History:   Diagnosis Date   • HTN  (hypertension)    • Near syncope    • Sinus bradycardia        Past Surgical History:   Procedure Laterality Date   • HAND SURGERY Left    • KNEE ARTHROSCOPY Right          Current Outpatient Medications:   •  lisinopril (PRINIVIL,ZESTRIL) 10 MG tablet, Take 1 tablet by mouth Daily., Disp: , Rfl:   •  nebivolol (BYSTOLIC) 5 MG tablet, 1 tablet Daily., Disp: , Rfl:   •  desmopressin (DDAVP) 0.2 MG tablet, TAKE 2 TABLETS BY MOUTH EVERY NIGHT AT BEDTIME AS DIRECTED, Disp: , Rfl:     No Known Allergies    Immunization History   Administered Date(s) Administered   • COVID-19 ([employer] (SAM)) 03/13/2021       Family History   Problem Relation Age of Onset   • Diabetes Mother        Cancer-related family history is not on file.    Social History     Tobacco Use   • Smoking status: Never   • Smokeless tobacco: Never   Vaping Use   • Vaping Use: Never used   Substance Use Topics   • Alcohol use: No   • Drug use: No       ROS:    Review of Systems   Constitutional: Negative for chills, fatigue and fever.   HENT: Negative for congestion, drooling, ear discharge, rhinorrhea, sinus pressure and tinnitus.    Eyes: Negative for photophobia, pain and discharge.   Respiratory: Negative for apnea, choking and stridor.    Cardiovascular: Negative for palpitations.   Gastrointestinal: Negative for abdominal distention, abdominal pain and anal bleeding.   Endocrine: Negative for polydipsia and polyphagia.   Genitourinary: Negative for decreased urine volume, flank pain and genital sores.   Musculoskeletal: Negative for gait problem, neck pain and neck stiffness.   Skin: Negative for color change, rash and wound.   Neurological: Negative for tremors, seizures, syncope, facial asymmetry and speech difficulty.   Hematological: Negative for adenopathy.   Psychiatric/Behavioral: Negative for agitation, confusion, hallucinations and self-injury. The patient is not hyperactive.        Objective:    Vitals:    03/29/23 1522   BP: 174/90   Pulse: (!)  "46   Resp: 18   Temp: 97 °F (36.1 °C)   TempSrc: Infrared   Weight: 83.5 kg (184 lb)   Height: 170.2 cm (67\")   PainSc: 0-No pain     Body mass index is 28.82 kg/m².    ECOG    (0) Fully active, able to carry on all predisease performance without restriction    Physical Exam:  Physical Exam  Vitals and nursing note reviewed.   Constitutional:       General: He is not in acute distress.     Appearance: He is not diaphoretic.   HENT:      Head: Normocephalic and atraumatic.   Eyes:      General: No scleral icterus.        Right eye: No discharge.         Left eye: No discharge.      Conjunctiva/sclera: Conjunctivae normal.   Neck:      Thyroid: No thyromegaly.   Cardiovascular:      Rate and Rhythm: Normal rate and regular rhythm.      Heart sounds: Normal heart sounds.     No friction rub. No gallop.   Pulmonary:      Effort: Pulmonary effort is normal. No respiratory distress.      Breath sounds: No stridor. No wheezing.   Abdominal:      General: Bowel sounds are normal.      Palpations: Abdomen is soft. There is no mass.      Tenderness: There is no abdominal tenderness. There is no guarding or rebound.   Musculoskeletal:         General: No tenderness. Normal range of motion.      Cervical back: Normal range of motion and neck supple.   Lymphadenopathy:      Cervical: No cervical adenopathy.   Skin:     General: Skin is warm.      Findings: No erythema or rash.   Neurological:      Mental Status: He is alert and oriented to person, place, and time.      Motor: No abnormal muscle tone.   Psychiatric:         Behavior: Behavior normal.         RECENT LABS  WBC   Date Value Ref Range Status   03/29/2023 4.08 3.40 - 10.80 10*3/mm3 Final   10/15/2021 4.00 (L) 4.5 - 11.0 10*3/uL Final     RBC   Date Value Ref Range Status   03/29/2023 4.48 4.14 - 5.80 10*6/mm3 Final   10/15/2021 4.65 4.5 - 5.9 10*6/uL Final     Hemoglobin   Date Value Ref Range Status   03/29/2023 13.6 13.0 - 17.7 g/dL Final   10/15/2021 13.9 13.5 - " 17.5 g/dL Final     Hematocrit   Date Value Ref Range Status   03/29/2023 39.0 37.5 - 51.0 % Final   10/15/2021 42.0 41.0 - 53.0 % Final     MCV   Date Value Ref Range Status   03/29/2023 87.1 79.0 - 97.0 fL Final   10/15/2021 90.3 80.0 - 100.0 fL Final     MCH   Date Value Ref Range Status   03/29/2023 30.4 26.6 - 33.0 pg Final   10/15/2021 29.9 26.0 - 34.0 pg Final     MCHC   Date Value Ref Range Status   03/29/2023 34.9 31.5 - 35.7 g/dL Final   10/15/2021 33.1 31.0 - 37.0 g/dL Final     RDW   Date Value Ref Range Status   03/29/2023 12.1 (L) 12.3 - 15.4 % Final   10/15/2021 12.3 12.0 - 16.8 % Final     RDW-SD   Date Value Ref Range Status   03/29/2023 38.0 37.0 - 54.0 fl Final     MPV   Date Value Ref Range Status   03/29/2023 10.6 6.0 - 12.0 fL Final   10/15/2021 12.2 8.4 - 12.4 fL Final     Platelets   Date Value Ref Range Status   03/29/2023 224 140 - 450 10*3/mm3 Final   10/15/2021 252 140 - 440 10*3/uL Final     Neutrophil Rel %   Date Value Ref Range Status   10/15/2021 48.1 45 - 80 % Final     Neutrophil %   Date Value Ref Range Status   03/29/2023 42.2 (L) 42.7 - 76.0 % Final     Lymphocyte Rel %   Date Value Ref Range Status   10/15/2021 39.3 15 - 50 % Final     Lymphocyte %   Date Value Ref Range Status   03/29/2023 45.1 19.6 - 45.3 % Final     Monocyte Rel %   Date Value Ref Range Status   10/15/2021 9.8 0 - 15 % Final     Monocyte %   Date Value Ref Range Status   03/29/2023 10.3 5.0 - 12.0 % Final     Eosinophil %   Date Value Ref Range Status   03/29/2023 1.7 0.3 - 6.2 % Final   10/15/2021 1.8 0 - 7 % Final     Basophil Rel %   Date Value Ref Range Status   10/15/2021 0.5 0 - 2 % Final     Basophil %   Date Value Ref Range Status   03/29/2023 0.7 0.0 - 1.5 % Final     Immature Grans %   Date Value Ref Range Status   05/17/2022 0.5 0.0 - 0.5 % Final   10/15/2021 0.5 0.0 - 1.0 % Final     Neutrophils Absolute   Date Value Ref Range Status   10/15/2021 1.93 (L) 2.0 - 8.8 10*3/uL Final     Neutrophils,  Absolute   Date Value Ref Range Status   03/29/2023 1.72 1.70 - 7.00 10*3/mm3 Final     Lymphocytes Absolute   Date Value Ref Range Status   10/15/2021 1.57 0.7 - 5.5 10*3/uL Final     Lymphocytes, Absolute   Date Value Ref Range Status   03/29/2023 1.84 0.70 - 3.10 10*3/mm3 Final     Monocytes Absolute   Date Value Ref Range Status   10/15/2021 0.39 0.0 - 1.7 10*3/uL Final     Monocytes, Absolute   Date Value Ref Range Status   03/29/2023 0.42 0.10 - 0.90 10*3/mm3 Final     Eosinophils Absolute   Date Value Ref Range Status   10/15/2021 0.07 0.0 - 0.8 10*3/uL Final     Eosinophils, Absolute   Date Value Ref Range Status   03/29/2023 0.07 0.00 - 0.40 10*3/mm3 Final     Basophils Absolute   Date Value Ref Range Status   10/15/2021 0.02 0.0 - 0.2 10*3/uL Final     Basophils, Absolute   Date Value Ref Range Status   03/29/2023 0.03 0.00 - 0.20 10*3/mm3 Final     Immature Grans, Absolute   Date Value Ref Range Status   05/17/2022 0.02 0.00 - 0.05 10*3/mm3 Final   10/15/2021 0.02 0.00 - 0.10 10*3/uL Final     nRBC   Date Value Ref Range Status   05/17/2022 0.0 0.0 - 0.2 /100 WBC Final       Lab Results   Component Value Date    GLUCOSE 86 05/17/2022    BUN 11 05/17/2022    CREATININE 1.04 05/17/2022    EGFRIFAFRI 86 04/07/2021    BCR 10.6 05/17/2022    K 3.6 01/18/2023    CO2 29.8 (H) 05/17/2022    CALCIUM 9.5 05/17/2022    ALBUMIN 4.3 10/15/2021    LABIL2 1.6 10/15/2021    AST 33 10/15/2021    ALT 26 10/15/2021         Assessment & Plan   Renal cell carcinoma, unspecified laterality (HCC)  - CBC & Differential      1. Papillary renal cell carcinoma status post partial nephrectomy pT1 apN0 M0.  Positive margin of resection at the parenchymal margin.    2. History of bladder cancer, followed by Dr. Luis Manuel Carmen  3. Bradycardia on Bystolic. Patient to follow-up with his PCP          Plans       · Follow-up with PCP for  bradycardia  · Patient to [facility] May [facility]23  · Follow-up  with me second week in May 2023  · Refer to Dr. Posadas radiation oncologist to weigh in on the positive margin of resection  · BMP today  · Creatinine 1.0    Patient verbalized understanding and is in agreement of the above plan.            I spent 60 total minutes, face-to-face, caring for Sen today.  90% of this time involved counseling and/or coordination of care as documented within this note.

## 2023-03-31 ENCOUNTER — PATIENT ROUNDING (BHMG ONLY) (OUTPATIENT)
Dept: ONCOLOGY | Facility: CLINIC | Age: 63
End: 2023-03-31
Payer: COMMERCIAL

## 2023-03-31 NOTE — PROGRESS NOTES
March 31, 2023    Hello, may I speak with Sen Garcia?    My name is Susan Delacruz      I am  with [facility] [address] [place] [address] [phone].    Before we get started may I verify your date of birth? 1960    I am calling to officially welcome you to our practice and ask about your recent visit. Is this a good time to talk? no    Tell me about your visit with us. What things went well?  A My Chart message was sent to the patient.       We're always looking for ways to make our patients' experiences even better. Do you have recommendations on ways we may improve?  no    Overall were you satisfied with your first visit to our practice? yes       I appreciate you taking the time to speak with me today. Is there anything else I can do for you? no      Thank you, and have a great day.

## 2023-04-11 ENCOUNTER — TELEPHONE (OUTPATIENT)
Dept: RADIATION ONCOLOGY | Facility: HOSPITAL | Age: 63
End: 2023-04-11
Payer: COMMERCIAL

## 2023-04-11 DIAGNOSIS — C64.9 RENAL CELL CARCINOMA, UNSPECIFIED LATERALITY: Primary | ICD-10-CM

## 2023-04-11 NOTE — TELEPHONE ENCOUNTER
Left message for a return call. Need to schedule a consult appt with dr felder per SIMON Carpenter referral

## 2023-04-13 ENCOUNTER — TELEPHONE (OUTPATIENT)
Dept: ONCOLOGY | Facility: CLINIC | Age: 63
End: 2023-04-13
Payer: COMMERCIAL

## 2023-04-13 NOTE — TELEPHONE ENCOUNTER
Received call from Gauri in radiation stating that the pt was questioning why he was being referred to Dr. Posadas. I explained to the pt that the margins of the area he had removed were positive for cancer so Dr. Carpenter wants Dr. Posadas to evaluate him and see if radiation is needed. Pt verbalized understanding. No further questions at this time. Transferred back to Gauri for scheduling.

## 2023-04-14 ENCOUNTER — HOSPITAL ENCOUNTER (OUTPATIENT)
Dept: RADIATION ONCOLOGY | Facility: HOSPITAL | Age: 63
Setting detail: RADIATION/ONCOLOGY SERIES
End: 2023-04-14
Payer: COMMERCIAL

## 2023-04-14 NOTE — PROGRESS NOTES
RADIATION THERAPY CONSULT NOTE    NAME: Sen Garcia  YOB: 1960  MRN #: [id number]  DATE OF SERVICE: 4/18/2023  REFERRING PROVIDER: Dianne Carpenter MD  [address] [place] [address] 1  [place],  [facility]  PRIMARY CARE PROVIDER: Hilaria Kimble NP-C    DIAGNOSIS:  [id number], Left papillary renal cell carcinoma, type 1, grade 2, 1.6 cm, confined to renal parenchyma, invasive carcinoma present at the parenchymal margin  Encounter Diagnosis   Name Primary?   • Papillary renal cell carcinoma Yes     REASON FOR CONSULTATION/CHIEF COMPLAINT:  Left kidney cancer  I was asked to see the patient at the request of the referring provider noted below for advice and recommendations regarding this diagnosis and the role of radiation therapy.                              REQUESTING PHYSICIAN:    Dianne Carpenter Md  [address] [place] [address]  [facility]  [place] [address]    RECORDS OBTAINED:  Records of the patients history including those obtained from the referring provider were reviewed and summarized in detail.    HISTORY OF PRESENT ILLNESS:  Sen Garcia is a 62 y.o. male referred here by Dr. Carpenter for full evaluation of his recent RCC.    Patient has a history of malignant neoplasm of the bladder (cysto/bladder bx 4/13/21, s/p TURBT 4/13/21 with pathology showing low grade non-invasive TCC; s/p TURBT 5/24/22 with pathology showing CIS, no muscle invasion noted).    he had a CT scan of the abdomen and pelvis on 11/16/2022 which basically revealed water density cyst at the posterior lower pole of the left kidney.  In addition there was a partially exophytic hypodense lesion at the medial lower left pole measuring 1.2 cm.  There was evidence of prostatic enlargement measuring up to 4.7 cm otherwise there was no evidence of metastatic disease in the abdomen and pelvis.  The left lower pole 1.2 cm enhancing renal mass also shows for renal cell carcinoma with consideration for possible papillary or  chromophobe subtype as seen patient then underwent a partial left nephrectomy performed by his urologist Dr. Luis Manuel Carmen on 1/18/2023.    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  Tumor was confined to renal parenchyma.  There was tumor involving the parenchymal margin, Positive Margin.  Pathology stage is pT1a pNX M0     Scheduled for follow-up in first week in May 2023 during which time he will have CT scan performed by Dr. Luis Manuel Carmen.     He has recovered from his surgery.  He feels well and denies any abdominal pain, nausea vomiting or weight loss.     Patient has a history of superficial bladder cancer for which she will also be seen by Dr. Carmen in the near future.     He does not smoke and there is no family history of any malignancies.        The following portions of the patient's history were reviewed and updated as appropriate: allergies, current medications, past family history, past medical history, past social history, past surgical history and problem list. Reviewed with the patient and remain unchanged.    PAST MEDICAL HISTORY:  he has a past medical history of HTN (hypertension), Near syncope, and Sinus bradycardia.    MEDICATIONS:    Current Outpatient Medications:   •  desmopressin (DDAVP) 0.2 MG tablet, TAKE 2 TABLETS BY MOUTH EVERY NIGHT AT BEDTIME AS DIRECTED, Disp: , Rfl:   •  lisinopril (PRINIVIL,ZESTRIL) 10 MG tablet, Take 1 tablet by mouth Daily., Disp: , Rfl:   •  nebivolol (BYSTOLIC) 10 MG tablet, Take 1 tablet by mouth Daily., Disp: , Rfl:     ALLERGIES:  No Known Allergies  PAST SURGICAL HISTORY:  he has a past surgical history that includes Knee arthroscopy (Right) and Hand surgery (Left).    PREVIOUS RADIOTHERAPY OR CHEMOTHERAPY:  no    FAMILY HISTORY:  hisfamily history includes Diabetes in his mother.    SOCIAL HISTORY:  he reports that he has never smoked. He has never used smokeless tobacco.  He reports that he does not drink alcohol and does not use drugs.    PAIN AND PAIN MANAGEMENT:  Denies pain.  NUTRITIONAL STATUS:   no issues  KPS:  90:  Minor signs or symptoms  PHQ-9 Total Score: Distress tool completed.    REVIEW OF SYSTEMS:   Review of Systems   General: No fevers, chills, weight change, or drenching night sweats. Skin: No rashes or jaundice.  HEENT: No change in vision or hearing, no headaches.  Neck: No dysphagia or masses.  Heme/Lymph: No easy bruising or bleeding.  Respiratory System: No shortness of breath or cough.  Cardiovascular: No chest pain, palpitations, or dyspnea on exertion.  - Pacemaker. GI: No nausea, vomiting, diarrhea, melena, or hematochezia.  : No dysuria or hematuria.  Endocrine: No heat or cold intolerance. Musculoskeletal: No myalgias or arthralgias.  Neuro: No weakness, numbness, syncope, or seizures. Psych: No mood changes or depression. Ext: Denies swelling.      Objective   VITAL SIGNS:  Vitals:    04/18/23 1501   BP: 169/86   Pulse: (!) 47   Resp: 20   SpO2: 99%       PHYSICAL EXAM:  GENERAL:  No apparent distress. Sitting comfortably in room.    HEENT:  Normocephalic, atraumatic. Pupils are equal, round, reactive to light. Sclera anicteric. Conjunctiva not injected. Oropharynx without erythema, ulcerations or thrush.   NECK:  Supple with no masses.  LYMPHATIC:  No cervical, supraclavicular or axillary adenopathy appreciated bilaterally.   CARDIOVASCULAR:  S1 & S2 detected; no murmurs, rubs or gallops.  CHEST:  Clear to auscultation bilaterally; no wheezes, crackles or rubs. Work of breathing normal.  ABDOMEN:  Bowel sounds present. Abdomen is soft, nontender, nondistended.   MUSCULOSKELETAL:  No tenderness to palpation along the spine or scapulae. Normal range of motion.  EXTREMITIES:  No clubbing, cyanosis, edema.  SKIN:  No erythema, rashes, ulcerations noted.   NEUROLOGIC:  Cranial nerves II-XII grossly intact bilaterally. No focal neurologic  deficits.  PSYCHIATRIC:  Alert, aware, and appropriate.      PERTINENT IMAGING/PATHOLOGY/LABS (Medical Decision Making):      COORDINATION OF CARE:  A copy of this note is sent to the referring provider.    PATHOLOGY (Reviewed): OSH path to be requested for full review.  Path reports + margin.    IMAGING (Reviewed): as noted above.    LABS (Reviewed):  HEMATOLOGY:  WBC   Date Value Ref Range Status   03/29/2023 4.08 3.40 - 10.80 10*3/mm3 Final   01/19/2023 8.99 4.5 - 11.0 10*3/uL Final     RBC   Date Value Ref Range Status   03/29/2023 4.48 4.14 - 5.80 10*6/mm3 Final   01/19/2023 4.17 (L) 4.5 - 5.9 10*6/uL Final     Hemoglobin   Date Value Ref Range Status   03/29/2023 13.6 13.0 - 17.7 g/dL Final   01/19/2023 12.3 (L) 13.5 - 17.5 g/dL Final     Hematocrit   Date Value Ref Range Status   03/29/2023 39.0 37.5 - 51.0 % Final   01/19/2023 36.2 (L) 41.0 - 53.0 % Final     Platelets   Date Value Ref Range Status   03/29/2023 224 140 - 450 10*3/mm3 Final   01/19/2023 228 140 - 440 10*3/uL Final     CHEMISTRY:  Lab Results   Component Value Date    GLUCOSE 96 03/29/2023    BUN 10 03/29/2023    CREATININE 1.08 03/29/2023    EGFRIFAFRI 86 04/07/2021    BCR 9.3 03/29/2023    K 4.4 03/29/2023    CO2 29.0 03/29/2023    CALCIUM 9.4 03/29/2023    ALBUMIN 4.1 03/29/2023    LABIL2 1.6 10/15/2021    AST 23 03/29/2023    ALT 18 03/29/2023     Assessment & Plan   ASSESSMENT AND PLAN:    1. Papillary renal cell carcinoma       [id number], Left papillary renal cell carcinoma, type 1, grade 2, 1.6 cm, confined to renal parenchyma, invasive carcinoma present at the parenchymal margin  -Dr. Carpenter saw Mr. Garcia at the request of [facility].  -She was concerned about the positive margin finding on path report and asked me to evaluate and discuss if adjuvant therapy was indicated.  I have reviewed the last couple of Urology notes and will be reaching out to Dr. Pierre Carmen to discuss the margin status to see if he has had any discussions  with Pathology there at [facility].  -We can get the path sent over for review and also present his case at Urologic tumor board for the [facility].  -Per NCCN guidelines further surgery or observation (given it is pT1a, type 1 lesion) can be discussed. I do not think XRT would be a next step nor adjuvant immunotherapy.    I will get the case put on for [facility] tumor board with path to be reviewed.    This assessment comes from my review of the imaging, pathology, physician notes and other pertinent information as mentioned.    DISPOSITION:  Pending.  Reaching out to Dr. Carmen as well.    TIME SPENT WITH PATIENT:  I spent 50 minutes caring for Sen on this date of service. This time includes time spent by me in the following activities: preparing for the visit, reviewing tests, obtaining and/or reviewing a separately obtained history, performing a medically appropriate examination and/or evaluation, counseling and educating the patient/family/caregiver, referring and communicating with other health care professionals, documenting information in the medical record, independently interpreting results and communicating that information with the patient/family/caregiver and care coordination         CC: Dianne Carpenter,* Hilaria Kimble, MD Randal Daily MD  4/18/2023  4:51 PM EDT

## 2023-04-17 ENCOUNTER — TELEPHONE (OUTPATIENT)
Dept: RADIATION ONCOLOGY | Facility: HOSPITAL | Age: 63
End: 2023-04-17
Payer: COMMERCIAL

## 2023-04-17 PROBLEM — C64.9 PAPILLARY RENAL CELL CARCINOMA: Status: ACTIVE | Noted: 2023-04-17

## 2023-04-17 RX ORDER — NEBIVOLOL 10 MG/1
1 TABLET ORAL DAILY
COMMUNITY
Start: 2023-04-13

## 2023-04-18 ENCOUNTER — CONSULT (OUTPATIENT)
Dept: RADIATION ONCOLOGY | Facility: HOSPITAL | Age: 63
End: 2023-04-18
Payer: COMMERCIAL

## 2023-04-18 VITALS
HEART RATE: 47 BPM | OXYGEN SATURATION: 99 % | DIASTOLIC BLOOD PRESSURE: 86 MMHG | BODY MASS INDEX: 28.82 KG/M2 | RESPIRATION RATE: 20 BRPM | WEIGHT: 184 LBS | SYSTOLIC BLOOD PRESSURE: 169 MMHG

## 2023-04-18 DIAGNOSIS — C64.9 PAPILLARY RENAL CELL CARCINOMA: Primary | ICD-10-CM

## 2023-04-18 PROCEDURE — G0463 HOSPITAL OUTPT CLINIC VISIT: HCPCS | Performed by: RADIOLOGY

## 2023-05-05 ENCOUNTER — LAB REQUISITION (OUTPATIENT)
Dept: LAB | Facility: HOSPITAL | Age: 63
End: 2023-05-05
Payer: COMMERCIAL

## 2023-05-05 DIAGNOSIS — Z00.00 ENCOUNTER FOR GENERAL ADULT MEDICAL EXAMINATION WITHOUT ABNORMAL FINDINGS: ICD-10-CM

## 2023-05-05 LAB
LAB AP CASE REPORT: NORMAL
LAB AP DIAGNOSIS COMMENT: NORMAL
LAB AP SPECIAL STAINS: NORMAL
PATH REPORT.FINAL DX SPEC: NORMAL
PATH REPORT.GROSS SPEC: NORMAL

## 2023-05-05 PROCEDURE — 88321 CONSLTJ&REPRT SLD PREP ELSWR: CPT | Performed by: RADIOLOGY

## 2023-05-22 NOTE — PROGRESS NOTES
Hematology/Oncology Outpatient Follow Up    PATIENT NAME:Sen Garcia  :1960  MRN: [id number]  PRIMARY CARE PHYSICIAN: Hilaria Kimble NP-C  REFERRING PHYSICIAN: No ref. provider found    Chief Complaint   Patient presents with   • Follow-up     Renal cell carcinoma        HISTORY OF PRESENT ILLNESS:     This is a 62-year-old male who has referred secondary to kidney cancer.  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  In addition there was a partially exophytic hypodense lesion at the medial lower left pole measuring 1.2 cm.  There was evidence of prostatic enlargement measuring up to 4.7 cm otherwise there was no evidence of metastatic disease in the abdomen and pelvis.  The left lower pole 1.2 cm enhancing renal mass also shows for renal cell carcinoma with consideration for possible papillary or chromophobe subtype as seen patient then underwent a partial left nephrectomy performed by his urologist Dr. Luis Manuel Carmen on 2023.  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  Tumor was confined to renal parenchyma.  There was tumor involving the parenchymal margin.  Pathology stage is pT1a pNX M0     Scheduled for follow-up in first week in May 2023 during which time he will have CT scan performed by Dr. Luis Manuel Carmen.     He has recovered from his surgery.  He feels well and denies any abdominal pain, nausea vomiting or weight loss.     Patient has a history of superficial bladder cancer for which she will also be seen by Dr. Carmen in the near future.     He does not smoke and there is no family history of any malignancies.     Patient is      Patient works on cars    · 2023: Adjuvant XRT not recommended for positive parenchymal  margin of resection by Dr. Posadas surveillance CT scans recommended  Past Medical History:   Diagnosis Date   • HTN (hypertension)    • Near syncope    • Sinus bradycardia        Past Surgical History:   Procedure Laterality Date   • HAND SURGERY Left    • KNEE ARTHROSCOPY Right          Current Outpatient Medications:   •  ciprofloxacin (CIPRO) 500 MG tablet, Take 1 tablet by mouth Every 12 (Twelve) Hours., Disp: , Rfl:   •  desmopressin (DDAVP) 0.2 MG tablet, TAKE 2 TABLETS BY MOUTH EVERY NIGHT AT BEDTIME AS DIRECTED, Disp: , Rfl:   •  lisinopril (PRINIVIL,ZESTRIL) 10 MG tablet, Take 1 tablet by mouth Daily., Disp: , Rfl:   •  nebivolol (BYSTOLIC) 10 MG tablet, Take 1 tablet by mouth Daily., Disp: , Rfl:     No Known Allergies    Family History   Problem Relation Age of Onset   • Diabetes Mother        Cancer-related family history is not on file.    Social History     Tobacco Use   • Smoking status: Never   • Smokeless tobacco: Never   Vaping Use   • Vaping Use: Never used   Substance Use Topics   • Alcohol use: No   • Drug use: No       HPI, ROS and PFSH have been reviewed and confirmed on 5/24/2023.     SUBJECTIVE:      He was recently diagnosed with prostatitis currently on antibiotics.          REVIEW OF SYSTEMS:  Review of Systems   Constitutional: Negative for chills, fatigue and fever.   HENT: Negative for congestion, drooling, ear discharge, rhinorrhea, sinus pressure and tinnitus.    Eyes: Negative for photophobia, pain and discharge.   Respiratory: Negative for apnea, choking and stridor.    Cardiovascular: Negative for palpitations.   Gastrointestinal: Negative for abdominal distention, abdominal pain and anal bleeding.   Endocrine: Negative for polydipsia and polyphagia.   Genitourinary: Negative for decreased urine volume, flank pain and genital sores.   Musculoskeletal: Negative for gait problem, neck pain and neck stiffness.   Skin: Negative for color change, rash and wound.   Neurological: Negative  "for tremors, seizures, syncope, facial asymmetry and speech difficulty.   Hematological: Negative for adenopathy.   Psychiatric/Behavioral: Negative for agitation, confusion, hallucinations and self-injury. The patient is not hyperactive.        OBJECTIVE:    Vitals:    05/24/23 1509   BP: 173/82   Pulse: (!) 49   Resp: 18   Temp: 98 °F (36.7 °C)   TempSrc: Infrared   SpO2: 99%   Weight: 83.5 kg (184 lb)   Height: 170.2 cm (67\")   PainSc: 0-No pain     Body mass index is 28.82 kg/m².    ECOG  (0) Fully active, able to carry on all predisease performance without restriction    Physical Exam  Vitals and nursing note reviewed.   Constitutional:       General: He is not in acute distress.     Appearance: He is not diaphoretic.   HENT:      Head: Normocephalic and atraumatic.   Eyes:      General: No scleral icterus.        Right eye: No discharge.         Left eye: No discharge.      Conjunctiva/sclera: Conjunctivae normal.   Neck:      Thyroid: No thyromegaly.   Cardiovascular:      Rate and Rhythm: Normal rate and regular rhythm.      Heart sounds: Normal heart sounds.     No friction rub. No gallop.   Pulmonary:      Effort: Pulmonary effort is normal. No respiratory distress.      Breath sounds: No stridor. No wheezing.   Abdominal:      General: Bowel sounds are normal.      Palpations: Abdomen is soft. There is no mass.      Tenderness: There is no abdominal tenderness. There is no guarding or rebound.   Musculoskeletal:         General: No tenderness. Normal range of motion.      Cervical back: Normal range of motion and neck supple.   Lymphadenopathy:      Cervical: No cervical adenopathy.   Skin:     General: Skin is warm.      Findings: No erythema or rash.   Neurological:      Mental Status: He is alert and oriented to person, place, and time.      Motor: No abnormal muscle tone.   Psychiatric:         Behavior: Behavior normal.         RECENT LABS  WBC   Date Value Ref Range Status   05/24/2023 3.70 3.40 - " 10.80 10*3/mm3 Final   01/19/2023 8.99 4.5 - 11.0 10*3/uL Final     RBC   Date Value Ref Range Status   05/24/2023 4.39 4.14 - 5.80 10*6/mm3 Final   01/19/2023 4.17 (L) 4.5 - 5.9 10*6/uL Final     Hemoglobin   Date Value Ref Range Status   05/24/2023 13.6 13.0 - 17.7 g/dL Final   01/19/2023 12.3 (L) 13.5 - 17.5 g/dL Final     Hematocrit   Date Value Ref Range Status   05/24/2023 38.3 37.5 - 51.0 % Final   01/19/2023 36.2 (L) 41.0 - 53.0 % Final     MCV   Date Value Ref Range Status   05/24/2023 87.2 79.0 - 97.0 fL Final   01/19/2023 86.8 80.0 - 100.0 fL Final     MCH   Date Value Ref Range Status   05/24/2023 31.0 26.6 - 33.0 pg Final   01/19/2023 29.5 26.0 - 34.0 pg Final     MCHC   Date Value Ref Range Status   05/24/2023 35.5 31.5 - 35.7 g/dL Final   01/19/2023 34.0 31.0 - 37.0 g/dL Final     RDW   Date Value Ref Range Status   05/24/2023 12.5 12.3 - 15.4 % Final   01/19/2023 12.4 12.0 - 16.8 % Final     RDW-SD   Date Value Ref Range Status   05/24/2023 38.8 37.0 - 54.0 fl Final     MPV   Date Value Ref Range Status   05/24/2023 11.0 6.0 - 12.0 fL Final   01/19/2023 11.5 8.4 - 12.4 fL Final     Platelets   Date Value Ref Range Status   05/24/2023 221 140 - 450 10*3/mm3 Final   01/19/2023 228 140 - 440 10*3/uL Final     Neutrophil Rel %   Date Value Ref Range Status   01/19/2023 80.0 45 - 80 % Final     Neutrophil %   Date Value Ref Range Status   05/24/2023 45.5 42.7 - 76.0 % Final     Lymphocyte Rel %   Date Value Ref Range Status   01/19/2023 12.1 (L) 15 - 50 % Final     Lymphocyte %   Date Value Ref Range Status   05/24/2023 41.9 19.6 - 45.3 % Final     Monocyte Rel %   Date Value Ref Range Status   01/19/2023 7.7 0 - 15 % Final     Monocyte %   Date Value Ref Range Status   05/24/2023 10.5 5.0 - 12.0 % Final     Eosinophil %   Date Value Ref Range Status   05/24/2023 1.6 0.3 - 6.2 % Final   01/19/2023 0.0 0 - 7 % Final     Basophil Rel %   Date Value Ref Range Status   01/19/2023 0.1 0 - 2 % Final      Basophil %   Date Value Ref Range Status   05/24/2023 0.5 0.0 - 1.5 % Final     Immature Grans %   Date Value Ref Range Status   01/19/2023 0.1 0.0 - 1.0 % Final     Neutrophils Absolute   Date Value Ref Range Status   01/19/2023 7.19 2.0 - 8.8 10*3/uL Final     Neutrophils, Absolute   Date Value Ref Range Status   05/24/2023 1.68 (L) 1.70 - 7.00 10*3/mm3 Final     Lymphocytes Absolute   Date Value Ref Range Status   01/19/2023 1.09 0.7 - 5.5 10*3/uL Final     Lymphocytes, Absolute   Date Value Ref Range Status   05/24/2023 1.55 0.70 - 3.10 10*3/mm3 Final     Monocytes Absolute   Date Value Ref Range Status   01/19/2023 0.69 0.0 - 1.7 10*3/uL Final     Monocytes, Absolute   Date Value Ref Range Status   05/24/2023 0.39 0.10 - 0.90 10*3/mm3 Final     Eosinophils Absolute   Date Value Ref Range Status   01/19/2023 0.00 0.0 - 0.8 10*3/uL Final     Eosinophils, Absolute   Date Value Ref Range Status   05/24/2023 0.06 0.00 - 0.40 10*3/mm3 Final     Basophils Absolute   Date Value Ref Range Status   01/19/2023 0.01 0.0 - 0.2 10*3/uL Final     Basophils, Absolute   Date Value Ref Range Status   05/24/2023 0.02 0.00 - 0.20 10*3/mm3 Final     Immature Grans, Absolute   Date Value Ref Range Status   01/19/2023 0.01 0.00 - 0.10 10*3/uL Final     nRBC   Date Value Ref Range Status   01/19/2023 0 0 /100(WBC) Final   05/17/2022 0.0 0.0 - 0.2 /100 WBC Final       Lab Results   Component Value Date    GLUCOSE 96 03/29/2023    BUN 10 03/29/2023    CREATININE 1.08 03/29/2023    EGFRIFAFRI 86 04/07/2021    BCR 9.3 03/29/2023    K 4.4 03/29/2023    CO2 29.0 03/29/2023    CALCIUM 9.4 03/29/2023    ALBUMIN 4.1 03/29/2023    LABIL2 1.6 10/15/2021    AST 23 03/29/2023    ALT 18 03/29/2023         Assessment & Plan     Renal cell carcinoma, unspecified laterality  - CBC & Differential    Bradycardia  - Ambulatory Referral to Cardiology      ASSESSMENT:      Renal cell carcinoma, unspecified laterality (HCC)  - CBC &  Differential        1. Papillary renal cell carcinoma status post partial nephrectomy pT1 apN0 M0.  Positive margin of resection at the parenchymal margin.   Adjuvant XRT not recommended for surveillance CT scans   2. History of bladder cancer, followed by Dr. Luis Manuel Carmen  3. Bradycardia on Bystolic.  Rate in the 40s.  I have given him referral to cardiologist              Plans         • Refer to cardiology for bradycardia  • Requested for CT scan ordered through [facility]  • If all negative then follow-up with me in 6 months  • All questions answered  • Creatinine 1.0     Patient verbalized understanding and is in agreement of the above plan.                 I spent 30 total minutes, face-to-face, caring for Sen today. 90% of this time involved counseling and/or coordination of care as documented within this note.

## 2023-05-23 ENCOUNTER — HOSPITAL ENCOUNTER (OUTPATIENT)
Dept: RADIATION ONCOLOGY | Facility: HOSPITAL | Age: 63
Setting detail: RADIATION/ONCOLOGY SERIES
End: 2023-05-23
Payer: COMMERCIAL

## 2023-05-23 NOTE — PROGRESS NOTES
RADIATION ONCOLOGY FOLLOW-UP NOTE    NAME: Sen Garcia  YOB: 1960  MRN #: [id number]  DATE OF SERVICE: 5/24/2023  REFERRING PROVIDER: Dianne Carpenter MD  [address] [facility]  [address]  [facility]  [place] [id number]  PRIMARY CARE PROVIDER: Hilaria Kimble NP-C    DIAGNOSIS:  [id number], Left papillary renal cell carcinoma, type 1, grade 2, 1.6 cm, confined to renal parenchyma, invasive carcinoma present at the parenchymal margin    REASON FOR VISIT/CC: Left papillary renal cell carcinoma,  1M F/U    HISTORY OF PRESENT ILLNESS: The patient is a 62 y.o. year old male who was last seen in our office on 04/18/2023 for consultation. The plan was to present case at tumor board, and follow up with Dr. Carmen.    He follows with Dr. Carpenter as well, but has not been seen since his initial consultation with her on 03/29/2023. He is scheduled for follow up on 05/24/2023, prior to this appointment.    Sen Garcia is a 62 y.o. male referred here by Dr. Carpenter for full evaluation of his recent RCC.     Patient has a history of malignant neoplasm of the bladder (cysto/bladder bx 4/13/21, s/p TURBT 4/13/21 with pathology showing low grade non-invasive TCC; s/p TURBT 5/24/22 with pathology showing CIS, no muscle invasion noted).     he had a CT scan of the abdomen and pelvis on 11/16/2022 which basically revealed water density cyst at the posterior lower pole of the left kidney.  In addition there was a partially exophytic hypodense lesion at the medial lower left pole measuring 1.2 cm.  There was evidence of prostatic enlargement measuring up to 4.7 cm otherwise there was no evidence of metastatic disease in the abdomen and pelvis.  The left lower pole 1.2 cm enhancing renal mass also shows for renal cell carcinoma with consideration for possible papillary or chromophobe subtype as seen patient then underwent a partial left nephrectomy performed by his urologist Dr. Luis Manuel Carmen on 1/18/2023.     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  Tumor was confined to renal parenchyma.  There was tumor involving the parenchymal margin, Positive Margin.  Pathology stage is pT1a pNX M0     Scheduled for follow-up in first week in May 2023 during which time he will have CT scan performed by Dr. Luis Manuel Carmen.     He has recovered from his surgery.  He feels well and denies any abdominal pain, nausea vomiting or weight loss.     Patient has a history of superficial bladder cancer     He saw Dr. Robertson and on 5/19/2023 had cystoscopy--Bladder showed no foreign bodies, no tumors, they noted a small prostatic abscessess-ordering a 6 week FU with PSA and CT pelvis.    I discussed today with patient, Dr. Robertson and Dr. Carpenter the following:  \"  I had his path looked at from the surgery with Dr. Carmen at [facility] by the Psychiatric team for our joint conference with [facility] and it was a positive margin nephrectomy but on a very favorable looking tumor.   Dr. Mckeon was at that meeting and Dr. Enrrique Robertson and they felt close observation was the next step.  I was just alerting you because the note from Dr. Carmen on 3/8/23 and your note from 4/12/23 had noted margins were negative for cancer.  I think Dr. Carpenter was asking if I would offer any adjuvant XRT and I had said no but would check with the surgical team after the path was reviewed.  Close observation is recommended.\"    The following portions of the patient's history were reviewed and updated as appropriate: allergies, current medications, past family history, past medical history, past social history, past surgical history and problem list. Reviewed with the patient and remain unchanged.    PAST MEDICAL HISTORY:  he has a past medical history of HTN (hypertension), Near syncope, and Sinus bradycardia.    MEDICATIONS:    Current Outpatient Medications:   •  ciprofloxacin (CIPRO) 500 MG " "tablet, Take 1 tablet by mouth Every 12 (Twelve) Hours., Disp: , Rfl:   •  desmopressin (DDAVP) 0.2 MG tablet, TAKE 2 TABLETS BY MOUTH EVERY NIGHT AT BEDTIME AS DIRECTED, Disp: , Rfl:   •  lisinopril (PRINIVIL,ZESTRIL) 10 MG tablet, Take 1 tablet by mouth Daily., Disp: , Rfl:   •  nebivolol (BYSTOLIC) 10 MG tablet, Take 1 tablet by mouth Daily., Disp: , Rfl:     ALLERGIES:  No Known Allergies    PAST SURGICAL HISTORY:  he has a past surgical history that includes Knee arthroscopy (Right) and Hand surgery (Left).    PREVIOUS RADIOTHERAPY OR CHEMOTHERAPY:  no    FAMILY HISTORY:  hisfamily history includes Diabetes in his mother.    SOCIAL HISTORY:  he reports that he has never smoked. He has never used smokeless tobacco. He reports that he does not drink alcohol and does not use drugs.    PAIN AND PAIN MANAGEMENT:  No pain.    NUTRITIONAL STATUS:  no issues    KPS:  100      REVIEW OF SYSTEMS:       General: No fevers, chills, weight change, or drenching night sweats. Skin: No rashes or jaundice.  HEENT: No change in vision or hearing, no headaches.  Neck: No dysphagia or masses.  Heme/Lymph: No easy bruising or bleeding.  Respiratory System: No shortness of breath or cough.  Cardiovascular: No chest pain, palpitations, or dyspnea on exertion.  - Pacemaker. GI: No nausea, vomiting, diarrhea, melena, or hematochezia.  : as noted above. Endocrine: No heat or cold intolerance. Musculoskeletal: No myalgias or arthralgias.  Neuro: No weakness, numbness, syncope, or seizures. Psych: No mood changes or depression. Ext: Denies swelling.    Objective   VITAL SIGNS:  Vitals:    05/24/23 1600   BP: 173/82   Pulse: 50   Resp: 18   Temp: 98 °F (36.7 °C)   TempSrc: Infrared   SpO2: 99%   Weight: 83.5 kg (184 lb)   Height: 170.2 cm (67\")   PainSc: 0-No pain         PERTINENT IMAGING/PATHOLOGY/LABS (Medical Decision Making):     COORDINATION OF CARE: A copy of this note is sent to the referring provider.    PATHOLOGY (Reviewed): "     IMAGING (Reviewed):     LABS (Reviewed):  HEMATOLOGY:  WBC   Date Value Ref Range Status   05/24/2023 3.70 3.40 - 10.80 10*3/mm3 Final   01/19/2023 8.99 4.5 - 11.0 10*3/uL Final     RBC   Date Value Ref Range Status   05/24/2023 4.39 4.14 - 5.80 10*6/mm3 Final   01/19/2023 4.17 (L) 4.5 - 5.9 10*6/uL Final     Hemoglobin   Date Value Ref Range Status   05/24/2023 13.6 13.0 - 17.7 g/dL Final   01/19/2023 12.3 (L) 13.5 - 17.5 g/dL Final     Hematocrit   Date Value Ref Range Status   05/24/2023 38.3 37.5 - 51.0 % Final   01/19/2023 36.2 (L) 41.0 - 53.0 % Final     Platelets   Date Value Ref Range Status   05/24/2023 221 140 - 450 10*3/mm3 Final   01/19/2023 228 140 - 440 10*3/uL Final     CHEMISTRY:  Lab Results   Component Value Date    GLUCOSE 96 03/29/2023    BUN 10 03/29/2023    CREATININE 1.08 03/29/2023    EGFRIFAFRI 86 04/07/2021    BCR 9.3 03/29/2023    K 4.4 03/29/2023    CO2 29.0 03/29/2023    CALCIUM 9.4 03/29/2023    ALBUMIN 4.1 03/29/2023    LABIL2 1.6 10/15/2021    AST 23 03/29/2023    ALT 18 03/29/2023     Assessment & Plan   ASSESSMENT AND PLAN:    1. Papillary renal cell carcinoma     [id number], Left papillary renal cell carcinoma, type 1, grade 2, 1.6 cm, confined to renal parenchyma, invasive carcinoma present at the parenchymal margin  -Dr. Carpenter saw Mr. Garcia at the request of [facility].  -She was concerned about the positive margin finding on path report and asked me to evaluate and discuss if adjuvant therapy was indicated.  I have reviewed the last couple of Urology notes and will be reaching out to Dr. Pierre Carmen to discuss the margin status to see if he has had any discussions with Pathology there at [facility].  -We can get the path sent over for review and also present his case at Urologic tumor board for the [facility].  -Per NCCN guidelines further surgery or observation (given it is pT1a, type 1 lesion) can be discussed. I do not think XRT would be a next step nor  adjuvant immunotherapy.      Interval assessment--case was discussed at tumor board as noted in the narrative above and no adjuvant XRT or surgical considerations at this time.  [facility] is ordering PSA and CT Pelvis in 6 weeks. I discussed the positive margin today with Dr. Bentley Robertson and he will be following the patient going forward and agrees with observation.  I will remain available PRN but no planned further f/u with XRT clinic at this time.           TIME SPENT WITH PATIENT:   I spent 25 minutes caring for Sen on this date of service. This time includes time spent by me in the following activities: preparing for the visit, reviewing tests, counseling and educating the patient/family/caregiver, referring and communicating with other health care professionals, documenting information in the medical record and care coordination    CC: MD Bentley Lei MD John A Cox, MD  5/25/2023  10:36 AM EDT

## 2023-05-24 ENCOUNTER — OFFICE VISIT (OUTPATIENT)
Dept: ONCOLOGY | Facility: CLINIC | Age: 63
End: 2023-05-24
Payer: COMMERCIAL

## 2023-05-24 ENCOUNTER — OFFICE VISIT (OUTPATIENT)
Dept: RADIATION ONCOLOGY | Facility: HOSPITAL | Age: 63
End: 2023-05-24
Payer: COMMERCIAL

## 2023-05-24 ENCOUNTER — LAB (OUTPATIENT)
Dept: LAB | Facility: HOSPITAL | Age: 63
End: 2023-05-24
Payer: COMMERCIAL

## 2023-05-24 VITALS
SYSTOLIC BLOOD PRESSURE: 173 MMHG | DIASTOLIC BLOOD PRESSURE: 82 MMHG | HEART RATE: 49 BPM | RESPIRATION RATE: 18 BRPM | TEMPERATURE: 98 F | BODY MASS INDEX: 28.88 KG/M2 | OXYGEN SATURATION: 99 % | WEIGHT: 184 LBS | HEIGHT: 67 IN

## 2023-05-24 VITALS
OXYGEN SATURATION: 99 % | RESPIRATION RATE: 18 BRPM | BODY MASS INDEX: 28.88 KG/M2 | TEMPERATURE: 98 F | HEIGHT: 67 IN | WEIGHT: 184 LBS | SYSTOLIC BLOOD PRESSURE: 173 MMHG | HEART RATE: 50 BPM | DIASTOLIC BLOOD PRESSURE: 82 MMHG

## 2023-05-24 DIAGNOSIS — R00.1 BRADYCARDIA: ICD-10-CM

## 2023-05-24 DIAGNOSIS — C64.9 RENAL CELL CARCINOMA, UNSPECIFIED LATERALITY: Primary | ICD-10-CM

## 2023-05-24 DIAGNOSIS — C64.9 PAPILLARY RENAL CELL CARCINOMA: ICD-10-CM

## 2023-05-24 LAB
BASOPHILS # BLD AUTO: 0.02 10*3/MM3 (ref 0–0.2)
BASOPHILS NFR BLD AUTO: 0.5 % (ref 0–1.5)
DEPRECATED RDW RBC AUTO: 38.8 FL (ref 37–54)
EOSINOPHIL # BLD AUTO: 0.06 10*3/MM3 (ref 0–0.4)
EOSINOPHIL NFR BLD AUTO: 1.6 % (ref 0.3–6.2)
ERYTHROCYTE [DISTWIDTH] IN BLOOD BY AUTOMATED COUNT: 12.5 % (ref 12.3–15.4)
HCT VFR BLD AUTO: 38.3 % (ref 37.5–51)
HGB BLD-MCNC: 13.6 G/DL (ref 13–17.7)
HOLD SPECIMEN: NORMAL
HOLD SPECIMEN: NORMAL
LYMPHOCYTES # BLD AUTO: 1.55 10*3/MM3 (ref 0.7–3.1)
LYMPHOCYTES NFR BLD AUTO: 41.9 % (ref 19.6–45.3)
MCH RBC QN AUTO: 31 PG (ref 26.6–33)
MCHC RBC AUTO-ENTMCNC: 35.5 G/DL (ref 31.5–35.7)
MCV RBC AUTO: 87.2 FL (ref 79–97)
MONOCYTES # BLD AUTO: 0.39 10*3/MM3 (ref 0.1–0.9)
MONOCYTES NFR BLD AUTO: 10.5 % (ref 5–12)
NEUTROPHILS NFR BLD AUTO: 1.68 10*3/MM3 (ref 1.7–7)
NEUTROPHILS NFR BLD AUTO: 45.5 % (ref 42.7–76)
PLATELET # BLD AUTO: 221 10*3/MM3 (ref 140–450)
PMV BLD AUTO: 11 FL (ref 6–12)
RBC # BLD AUTO: 4.39 10*6/MM3 (ref 4.14–5.8)
WBC NRBC COR # BLD: 3.7 10*3/MM3 (ref 3.4–10.8)

## 2023-05-24 PROCEDURE — 36415 COLL VENOUS BLD VENIPUNCTURE: CPT

## 2023-05-24 PROCEDURE — G0463 HOSPITAL OUTPT CLINIC VISIT: HCPCS | Performed by: RADIOLOGY

## 2023-05-24 PROCEDURE — 85025 COMPLETE CBC W/AUTO DIFF WBC: CPT

## 2023-05-24 RX ORDER — CIPROFLOXACIN 500 MG/1
1 TABLET, FILM COATED ORAL EVERY 12 HOURS SCHEDULED
COMMUNITY
Start: 2023-05-19

## 2023-07-27 ENCOUNTER — OFFICE VISIT (OUTPATIENT)
Dept: CARDIOLOGY | Facility: CLINIC | Age: 63
End: 2023-07-27
Payer: COMMERCIAL

## 2023-07-27 VITALS
SYSTOLIC BLOOD PRESSURE: 135 MMHG | HEIGHT: 67 IN | WEIGHT: 186 LBS | DIASTOLIC BLOOD PRESSURE: 73 MMHG | BODY MASS INDEX: 29.19 KG/M2 | HEART RATE: 58 BPM | OXYGEN SATURATION: 98 %

## 2023-07-27 DIAGNOSIS — R00.1 BRADYCARDIA: Primary | ICD-10-CM

## 2023-07-27 DIAGNOSIS — I10 ESSENTIAL HYPERTENSION: ICD-10-CM

## 2023-07-27 DIAGNOSIS — I25.3 ATRIAL SEPTAL ANEURYSM: ICD-10-CM

## 2023-07-27 PROCEDURE — 99214 OFFICE O/P EST MOD 30 MIN: CPT | Performed by: INTERNAL MEDICINE

## 2023-07-27 NOTE — PROGRESS NOTES
Subjective:     Encounter Date:07/27/2023      Patient ID: Sen Garcia is a 62 y.o. male.    Chief Complaint and history of present illness:     Follow-up for hypertension, bradycardia, vasovagal syncope    History of Present Illness:      Mr. Sen Garcia has PMH of    Hypertension  Bradycardia  History of vasovagal syncope 7/10/2018-while working in hot [place]  Bladder cancer, papillary renal cell CA, surgery  Non-smoker  Intolerant to hydrochlorothiazide caused him to develop gout  Family history of diabetes and hypertension in mother      Here for follow-up.  Patient was seen for evaluation and treatment of bradycardia.  Patient states his heart rate was low all his life, denies any chest pain shortness of breath palpitations lightheadedness dizziness loss of consciousness.  Patient's Bystolic was decreased and he was put on 5 mg of amlodipine and continued his lisinopril is here for follow-up.    Patient's arterial blood pressure is 135/73, heart rate 58 bpm, O2 sat of 98% on room air.    EKG done 4/7/2021 reveals bradycardia at the rate of 50 bpm.  EKG from 5/17/2022 revealed EKG with sinus bradycardia at the rate of 51 bpm with LVH  Echocardiogram 7/14/2023 reveals EF of 60 to 65% with interatrial septum aneurysm.    Labs from 10/15/2021 reveal lipid profile with cholesterol 219, triglycerides 96, HDL 40, .  CMP 3/29/2023 was normal.  CBC from 5/24/2023 is normal.  Labs from 7/11/2023 revealed normal TSH at 0.875 with normal proBNP of 57.  Normal BMP.      Assessment:  :    Bradycardia  Hypertension  Atrial septal aneurysm  History of papillary renal cell ca      Recommendations / Plan:        Patient underwent BNP level which is normal TSH which was normal echo which was unremarkable except for ASA.  Reviewed results with patient.  We will continue medical management with amlodipine and lisinopril and Bystolic as tolerated.  We will follow-up in cardiology clinic.          Procedures    Copied  text in this portion of the note has been reviewed and is accurate as of 7/27/2023  The following portions of the patient's history were reviewed and updated as appropriate: allergies, current medications, past family history, past medical history, past social history, past surgical history and problem list.    Assessment:         MDM       Diagnosis Plan   1. Bradycardia        2. Essential hypertension        3. Atrial septal aneurysm               Plan:               Past Medical History:  Past Medical History:   Diagnosis Date    Cancer Not sure.    Bladder  and left kidney.    HTN (hypertension)     Near syncope     Sinus bradycardia      Past Surgical History:  Past Surgical History:   Procedure Laterality Date    HAND SURGERY Left     KIDNEY SURGERY      KNEE ARTHROSCOPY Right       Allergies:  No Known Allergies  Home Meds:  Current Meds:     Current Outpatient Medications:     amLODIPine (NORVASC) 10 MG tablet, Take 1 tablet by mouth Daily. (Patient taking differently: Take 0.5 tablets by mouth Daily.), Disp: 90 tablet, Rfl: 3    desmopressin (DDAVP) 0.2 MG tablet, TAKE 2 TABLETS BY MOUTH EVERY NIGHT AT BEDTIME AS DIRECTED, Disp: , Rfl:     lisinopril (PRINIVIL,ZESTRIL) 10 MG tablet, Take 1 tablet by mouth Daily., Disp: , Rfl:     nebivolol (BYSTOLIC) 10 MG tablet, Take 0.5 tablets by mouth Daily., Disp: 90 tablet, Rfl: 3  Social History:   Social History     Tobacco Use    Smoking status: Never    Smokeless tobacco: Never   Substance Use Topics    Alcohol use: Not Currently      Family History:  Family History   Problem Relation Age of Onset    Hypertension Mother     Diabetes Mother               Review of Systems   Constitutional: Negative for malaise/fatigue.   Cardiovascular:  Negative for chest pain, leg swelling and palpitations.   Respiratory:  Negative for shortness of breath.    Skin:  Negative for rash.   Neurological:  Negative for dizziness, light-headedness and numbness.   All other systems  "are negative         Objective:     Physical Exam  /73   Pulse 58   Ht 170.2 cm (67\")   Wt 84.4 kg (186 lb)   SpO2 98%   BMI 29.13 kg/m²   General:  Appears in no acute distress  Eyes: Sclera is anicteric,  conjunctiva is clear   HEENT:  No JVD.  No carotid bruits  Respiratory: Respirations regular and unlabored at rest.  Clear to auscultation  Cardiovascular: S1,S2 Regular rate and rhythm. No murmur, rub or gallop auscultated.   Extremities: No digital clubbing or cyanosis, no edema  Skin: Color pink. Skin warm and dry to touch. No rashes  No xanthoma  Neuro: Alert and awake.    Lab Reviewed:         Jordan Garcia MD  7/27/2023 15:37 EDT      EMR Dragon/Transcription:   \"Dictated utilizing Dragon dictation\".        "

## 2023-08-17 ENCOUNTER — TELEPHONE (OUTPATIENT)
Dept: CARDIOLOGY | Facility: CLINIC | Age: 63
End: 2023-08-17
Payer: COMMERCIAL

## 2023-08-18 RX ORDER — AMLODIPINE BESYLATE 5 MG/1
5 TABLET ORAL DAILY
Qty: 90 TABLET | Refills: 3 | Status: SHIPPED | OUTPATIENT
Start: 2023-08-18

## 2023-08-18 NOTE — TELEPHONE ENCOUNTER
Rx Refill Note  Requested Prescriptions     Pending Prescriptions Disp Refills    amLODIPine (NORVASC) 5 MG tablet 90 tablet 3     Sig: Take 1 tablet by mouth Daily.      Last office visit with prescribing clinician: 7/27/2023   Last telemedicine visit with prescribing clinician: Visit date not found   Next office visit with prescribing clinician: 7/29/2024                             Rubi Carl MA  08/18/23, 15:10 EDT

## 2023-08-23 ENCOUNTER — TELEPHONE (OUTPATIENT)
Dept: CARDIOLOGY | Facility: CLINIC | Age: 63
End: 2023-08-23
Payer: COMMERCIAL

## 2023-08-23 NOTE — TELEPHONE ENCOUNTER
PATIENT WANTS TO KNOW IF HE NEEDS TO TAKE LISINOPRIL? DR. ARRIAGA RECENTLY PUT HIM ON AMLODIPINE.

## 2023-08-23 NOTE — TELEPHONE ENCOUNTER
Yes , last OV says to take both Amlodipine and Lisinopril     Per previous phone note   Pt was having edema , Amlodipine was decreased from 10mg to 5mg  How is edema? Is pt still taking 5mg     Per previous phone note   Pt was taking Lisinopril 10mg, but can increase to 20mg if needed   How is BP?     I will call pt to confirm meds and discuss

## 2023-08-24 NOTE — TELEPHONE ENCOUNTER
Called pt LM to confirm how his edema is and BP   And what MG of AMLODIPINE AND LISINOPRIL he's taking

## 2023-08-28 RX ORDER — NEBIVOLOL 5 MG/1
5 TABLET ORAL DAILY
COMMUNITY
Start: 2023-08-18

## 2023-11-27 ENCOUNTER — OFFICE VISIT (OUTPATIENT)
Dept: ONCOLOGY | Facility: CLINIC | Age: 63
End: 2023-11-27
Payer: COMMERCIAL

## 2023-11-27 ENCOUNTER — LAB (OUTPATIENT)
Dept: LAB | Facility: HOSPITAL | Age: 63
End: 2023-11-27
Payer: COMMERCIAL

## 2023-11-27 VITALS
DIASTOLIC BLOOD PRESSURE: 80 MMHG | OXYGEN SATURATION: 97 % | WEIGHT: 185 LBS | BODY MASS INDEX: 29.03 KG/M2 | HEIGHT: 67 IN | SYSTOLIC BLOOD PRESSURE: 134 MMHG | RESPIRATION RATE: 18 BRPM | HEART RATE: 53 BPM | TEMPERATURE: 98 F

## 2023-11-27 DIAGNOSIS — C64.9 RENAL CELL CARCINOMA, UNSPECIFIED LATERALITY: ICD-10-CM

## 2023-11-27 DIAGNOSIS — R00.1 BRADYCARDIA: Primary | ICD-10-CM

## 2023-11-27 DIAGNOSIS — C64.9 RENAL CELL CARCINOMA, UNSPECIFIED LATERALITY: Primary | ICD-10-CM

## 2023-11-27 LAB
BASOPHILS # BLD AUTO: 0.01 10*3/MM3 (ref 0–0.2)
BASOPHILS NFR BLD AUTO: 0.3 % (ref 0–1.5)
DEPRECATED RDW RBC AUTO: 39.4 FL (ref 37–54)
EOSINOPHIL # BLD AUTO: 0.05 10*3/MM3 (ref 0–0.4)
EOSINOPHIL NFR BLD AUTO: 1.3 % (ref 0.3–6.2)
ERYTHROCYTE [DISTWIDTH] IN BLOOD BY AUTOMATED COUNT: 12.4 % (ref 12.3–15.4)
HCT VFR BLD AUTO: 39.6 % (ref 37.5–51)
HGB BLD-MCNC: 13.4 G/DL (ref 13–17.7)
HOLD SPECIMEN: NORMAL
HOLD SPECIMEN: NORMAL
LYMPHOCYTES # BLD AUTO: 1.73 10*3/MM3 (ref 0.7–3.1)
LYMPHOCYTES NFR BLD AUTO: 44 % (ref 19.6–45.3)
MCH RBC QN AUTO: 30 PG (ref 26.6–33)
MCHC RBC AUTO-ENTMCNC: 33.8 G/DL (ref 31.5–35.7)
MCV RBC AUTO: 88.8 FL (ref 79–97)
MONOCYTES # BLD AUTO: 0.36 10*3/MM3 (ref 0.1–0.9)
MONOCYTES NFR BLD AUTO: 9.2 % (ref 5–12)
NEUTROPHILS NFR BLD AUTO: 1.78 10*3/MM3 (ref 1.7–7)
NEUTROPHILS NFR BLD AUTO: 45.2 % (ref 42.7–76)
PLATELET # BLD AUTO: 221 10*3/MM3 (ref 140–450)
PMV BLD AUTO: 10.8 FL (ref 6–12)
RBC # BLD AUTO: 4.46 10*6/MM3 (ref 4.14–5.8)
WBC NRBC COR # BLD AUTO: 3.93 10*3/MM3 (ref 3.4–10.8)

## 2023-11-27 PROCEDURE — 85025 COMPLETE CBC W/AUTO DIFF WBC: CPT

## 2023-11-27 PROCEDURE — 36415 COLL VENOUS BLD VENIPUNCTURE: CPT

## 2023-11-27 PROCEDURE — 99215 OFFICE O/P EST HI 40 MIN: CPT | Performed by: INTERNAL MEDICINE

## 2023-11-27 NOTE — PROGRESS NOTES
Hematology/Oncology Outpatient Follow Up    PATIENT NAME:Sen Garcia  :1960  MRN: [id number]  PRIMARY CARE PHYSICIAN: Hilaria Kimble NP-C  REFERRING PHYSICIAN: No ref. provider found    Chief Complaint   Patient presents with    Follow-up     Renal cell carcinoma, unspecified laterality          HISTORY OF PRESENT ILLNESS:     This is a 62-year-old male who has referred secondary to kidney cancer.  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  In addition there was a partially exophytic hypodense lesion at the medial lower left pole measuring 1.2 cm.  There was evidence of prostatic enlargement measuring up to 4.7 cm otherwise there was no evidence of metastatic disease in the abdomen and pelvis.  The left lower pole 1.2 cm enhancing renal mass also shows for renal cell carcinoma with consideration for possible papillary or chromophobe subtype as seen patient then underwent a partial left nephrectomy performed by his urologist Dr. Luis Manuel Carmen on 2023.  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  Tumor was confined to renal parenchyma.  There was tumor involving the parenchymal margin.  Pathology stage is pT1a pNX M0     Scheduled for follow-up in first week in May 2023 during which time he will have CT scan performed by Dr. Luis Manuel Carmen.     He has recovered from his surgery.  He feels well and denies any abdominal pain, nausea vomiting or weight loss.     Patient has a history of superficial bladder cancer for which she will also be seen by Dr. Carmen in the near future.     He does not smoke and there is no family history of any malignancies.     Patient is      Patient works on cars    2023: Adjuvant XRT not recommended for  positive parenchymal margin of resection by Dr. Posadas surveillance CT scans recommended  November 2023: Patient was found to have prostatic cancer [place] score 6  Past Medical History:   Diagnosis Date    Cancer Not sure.    Bladder  and left kidney.    HTN (hypertension)     Near syncope     Sinus bradycardia        Past Surgical History:   Procedure Laterality Date    HAND SURGERY Left     KIDNEY SURGERY      KNEE ARTHROSCOPY Right          Current Outpatient Medications:     amLODIPine (NORVASC) 5 MG tablet, Take 1 tablet by mouth Daily., Disp: 90 tablet, Rfl: 3    desmopressin (DDAVP) 0.2 MG tablet, TAKE 2 TABLETS BY MOUTH EVERY NIGHT AT BEDTIME AS DIRECTED, Disp: , Rfl:     lisinopril (PRINIVIL,ZESTRIL) 10 MG tablet, Take 1 tablet by mouth Daily., Disp: , Rfl:     nebivolol (BYSTOLIC) 5 MG tablet, Take 1 tablet by mouth Daily., Disp: , Rfl:     No Known Allergies    Family History   Problem Relation Age of Onset    Hypertension Mother     Diabetes Mother        Cancer-related family history is not on file.    Social History     Tobacco Use    Smoking status: Never    Smokeless tobacco: Never   Vaping Use    Vaping Use: Never used   Substance Use Topics    Alcohol use: Not Currently    Drug use: Not Currently       I have reviewed and confirmed the accuracy of the patient's history: Chief complaint, HPI, ROS, and Subjective as entered by the MA/LPN/RN. Diannefabi Carpenter MD 11/27/23      SUBJECTIVE:      He was recently diagnosed with prostatitis currently on antibiotics.    He has been found to have prostatic cancer Phill score 6 November 2023          REVIEW OF SYSTEMS:  Review of Systems   Constitutional:  Negative for chills, fatigue and fever.   HENT:  Negative for congestion, drooling, ear discharge, rhinorrhea, sinus pressure and tinnitus.    Eyes:  Negative for photophobia, pain and discharge.   Respiratory:  Negative for apnea, choking and stridor.    Cardiovascular:  Negative for palpitations.  "  Gastrointestinal:  Negative for abdominal distention, abdominal pain and anal bleeding.   Endocrine: Negative for polydipsia and polyphagia.   Genitourinary:  Negative for decreased urine volume, flank pain and genital sores.   Musculoskeletal:  Negative for gait problem, neck pain and neck stiffness.   Skin:  Negative for color change, rash and wound.   Neurological:  Negative for tremors, seizures, syncope, facial asymmetry and speech difficulty.   Hematological:  Negative for adenopathy.   Psychiatric/Behavioral:  Negative for agitation, confusion, hallucinations and self-injury. The patient is not hyperactive.        OBJECTIVE:    Vitals:    11/27/23 1509   BP: 134/80   Pulse: 53   Resp: 18   Temp: 98 °F (36.7 °C)   TempSrc: Infrared   SpO2: 97%   Weight: 83.9 kg (185 lb)   Height: 170.2 cm (67\")   PainSc: 0-No pain       Body mass index is 28.98 kg/m².    ECOG  (0) Fully active, able to carry on all predisease performance without restriction    Physical Exam  Vitals and nursing note reviewed.   Constitutional:       General: He is not in acute distress.     Appearance: He is not diaphoretic.   HENT:      Head: Normocephalic and atraumatic.   Eyes:      General: No scleral icterus.        Right eye: No discharge.         Left eye: No discharge.      Conjunctiva/sclera: Conjunctivae normal.   Neck:      Thyroid: No thyromegaly.   Cardiovascular:      Rate and Rhythm: Normal rate and regular rhythm.      Heart sounds: Normal heart sounds.      No friction rub. No gallop.   Pulmonary:      Effort: Pulmonary effort is normal. No respiratory distress.      Breath sounds: No stridor. No wheezing.   Abdominal:      General: Bowel sounds are normal.      Palpations: Abdomen is soft. There is no mass.      Tenderness: There is no abdominal tenderness. There is no guarding or rebound.   Musculoskeletal:         General: No tenderness. Normal range of motion.      Cervical back: Normal range of motion and neck supple. "   Lymphadenopathy:      Cervical: No cervical adenopathy.   Skin:     General: Skin is warm.      Findings: No erythema or rash.   Neurological:      Mental Status: He is alert and oriented to person, place, and time.      Motor: No abnormal muscle tone.   Psychiatric:         Behavior: Behavior normal.     I have reexamined the patient and the results are consistent with the previously documented exam. Dianne Carpenter MD      RECENT LABS  WBC   Date Value Ref Range Status   11/27/2023 3.93 3.40 - 10.80 10*3/mm3 Final   01/19/2023 8.99 4.5 - 11.0 10*3/uL Final     RBC   Date Value Ref Range Status   11/27/2023 4.46 4.14 - 5.80 10*6/mm3 Final   01/19/2023 4.17 (L) 4.5 - 5.9 10*6/uL Final     Hemoglobin   Date Value Ref Range Status   11/27/2023 13.4 13.0 - 17.7 g/dL Final   01/19/2023 12.3 (L) 13.5 - 17.5 g/dL Final     Hematocrit   Date Value Ref Range Status   11/27/2023 39.6 37.5 - 51.0 % Final   01/19/2023 36.2 (L) 41.0 - 53.0 % Final     MCV   Date Value Ref Range Status   11/27/2023 88.8 79.0 - 97.0 fL Final   01/19/2023 86.8 80.0 - 100.0 fL Final     MCH   Date Value Ref Range Status   11/27/2023 30.0 26.6 - 33.0 pg Final   01/19/2023 29.5 26.0 - 34.0 pg Final     MCHC   Date Value Ref Range Status   11/27/2023 33.8 31.5 - 35.7 g/dL Final   01/19/2023 34.0 31.0 - 37.0 g/dL Final     RDW   Date Value Ref Range Status   11/27/2023 12.4 12.3 - 15.4 % Final   01/19/2023 12.4 12.0 - 16.8 % Final     RDW-SD   Date Value Ref Range Status   11/27/2023 39.4 37.0 - 54.0 fl Final     MPV   Date Value Ref Range Status   11/27/2023 10.8 6.0 - 12.0 fL Final   01/19/2023 11.5 8.4 - 12.4 fL Final     Platelets   Date Value Ref Range Status   11/27/2023 221 140 - 450 10*3/mm3 Final   01/19/2023 228 140 - 440 10*3/uL Final     Neutrophil Rel %   Date Value Ref Range Status   01/19/2023 80.0 45 - 80 % Final     Neutrophil %   Date Value Ref Range Status   11/27/2023 45.2 42.7 - 76.0 % Final     Lymphocyte Rel %   Date  Value Ref Range Status   01/19/2023 12.1 (L) 15 - 50 % Final     Lymphocyte %   Date Value Ref Range Status   11/27/2023 44.0 19.6 - 45.3 % Final     Monocyte Rel %   Date Value Ref Range Status   01/19/2023 7.7 0 - 15 % Final     Monocyte %   Date Value Ref Range Status   11/27/2023 9.2 5.0 - 12.0 % Final     Eosinophil %   Date Value Ref Range Status   11/27/2023 1.3 0.3 - 6.2 % Final   01/19/2023 0.0 0 - 7 % Final     Basophil Rel %   Date Value Ref Range Status   01/19/2023 0.1 0 - 2 % Final     Basophil %   Date Value Ref Range Status   11/27/2023 0.3 0.0 - 1.5 % Final     Immature Grans %   Date Value Ref Range Status   01/19/2023 0.1 0.0 - 1.0 % Final     Neutrophils Absolute   Date Value Ref Range Status   01/19/2023 7.19 2.0 - 8.8 10*3/uL Final     Neutrophils, Absolute   Date Value Ref Range Status   11/27/2023 1.78 1.70 - 7.00 10*3/mm3 Final     Lymphocytes Absolute   Date Value Ref Range Status   01/19/2023 1.09 0.7 - 5.5 10*3/uL Final     Lymphocytes, Absolute   Date Value Ref Range Status   11/27/2023 1.73 0.70 - 3.10 10*3/mm3 Final     Monocytes Absolute   Date Value Ref Range Status   01/19/2023 0.69 0.0 - 1.7 10*3/uL Final     Monocytes, Absolute   Date Value Ref Range Status   11/27/2023 0.36 0.10 - 0.90 10*3/mm3 Final     Eosinophils Absolute   Date Value Ref Range Status   01/19/2023 0.00 0.0 - 0.8 10*3/uL Final     Eosinophils, Absolute   Date Value Ref Range Status   11/27/2023 0.05 0.00 - 0.40 10*3/mm3 Final     Basophils Absolute   Date Value Ref Range Status   01/19/2023 0.01 0.0 - 0.2 10*3/uL Final     Basophils, Absolute   Date Value Ref Range Status   11/27/2023 0.01 0.00 - 0.20 10*3/mm3 Final     Immature Grans, Absolute   Date Value Ref Range Status   01/19/2023 0.01 0.00 - 0.10 10*3/uL Final     nRBC   Date Value Ref Range Status   01/19/2023 0 0 /100(WBC) Final   05/17/2022 0.0 0.0 - 0.2 /100 WBC Final       Lab Results   Component Value Date    GLUCOSE 83 07/11/2023    BUN 16  07/11/2023    CREATININE 1.26 07/11/2023    EGFRIFAFRI 86 04/07/2021    BCR 12.7 07/11/2023    K 3.9 07/11/2023    CO2 27.0 07/11/2023    CALCIUM 9.5 07/11/2023    ALBUMIN 4.1 03/29/2023    LABIL2 1.6 10/15/2021    AST 23 03/29/2023    ALT 18 03/29/2023         Assessment & Plan     Renal cell carcinoma, unspecified laterality  - CBC & Differential        ASSESSMENT:      Renal cell carcinoma, unspecified laterality (HCC)  - CBC & Differential        Papillary renal cell carcinoma status post partial nephrectomy pT1 apN0 M0.  Positive margin of resection at the parenchymal margin.   Adjuvant XRT not recommended for surveillance CT scans   History of bladder cancer, status post TURBT and followed by Dr. Luis Manuel Carmen  Prostatic carcinoma PSA 6 Phill score 6  Patient has had 3 separate urinary tract malignancies but no family history of cancer.  I have recommended genetic testing for him  Bradycardia on Bystolic.  Rate in the 40s.  I have given him referral to cardiologist              Plans         Reviewed the above clinical data.  Patient has been found to have prostatic cancer early stage.  Requested for CT scans chest abdomen pelvis as well as bone scan [facility] November 2023  Patient has an appointment to see Dr. Posadas next week to discuss role of XRT, pros and cons  He was given information on cancer genetics to review  We will await the return of his records from [facility]  Follow-up third week in January 2024  All questions answered  Creatinine 1.0     Patient verbalized understanding and is in agreement of the above plan.                 I spent 40 total minutes, face-to-face, caring for Sen today. 90% of this time involved counseling and/or coordination of care as documented within this note.

## 2023-12-04 ENCOUNTER — APPOINTMENT (OUTPATIENT)
Dept: OTHER | Facility: HOSPITAL | Age: 63
End: 2023-12-04
Payer: COMMERCIAL

## 2023-12-04 ENCOUNTER — CONSULT (OUTPATIENT)
Dept: RADIATION ONCOLOGY | Facility: HOSPITAL | Age: 63
End: 2023-12-04
Payer: COMMERCIAL

## 2023-12-04 VITALS
WEIGHT: 185.4 LBS | RESPIRATION RATE: 18 BRPM | HEIGHT: 67 IN | TEMPERATURE: 98.3 F | OXYGEN SATURATION: 98 % | SYSTOLIC BLOOD PRESSURE: 144 MMHG | HEART RATE: 50 BPM | BODY MASS INDEX: 29.1 KG/M2 | DIASTOLIC BLOOD PRESSURE: 77 MMHG

## 2023-12-04 DIAGNOSIS — C64.9 PAPILLARY RENAL CELL CARCINOMA: ICD-10-CM

## 2023-12-04 DIAGNOSIS — C61 ADENOCARCINOMA OF PROSTATE: Primary | ICD-10-CM

## 2023-12-04 PROCEDURE — G0463 HOSPITAL OUTPT CLINIC VISIT: HCPCS | Performed by: RADIOLOGY

## 2023-12-04 NOTE — PROGRESS NOTES
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    Encounter Diagnoses   Name Primary?    Adenocarcinoma of prostate Yes    Papillary renal cell carcinoma      REASON FOR CONSULTATION/CHIEF COMPLAINT:  Prostate cancer  I was asked to see the patient at the request of the referring provider noted below for advice and recommendations regarding this diagnosis and the role of radiation therapy.                              REQUESTING PHYSICIAN:    Dianne Carpenter Md  [address] [place] [address] 1  [place] [address]    RECORDS OBTAINED:  Records of the patients history including those obtained from the referring provider were reviewed and summarized in detail.    HISTORY OF PRESENT ILLNESS:  Sen Garcia is a 63 y.o. male who was last seen in our office on 5/24/2023 for follow-up. Patient's case was discussed at multidisciplinary tumor board with recommendation of no adjuvant XRT or surgical considerations for RCC with positive margins s/p partial nephrectomy. [facility] ordering upcoming PSA and CT Pelvis. Discussed the positive margin with Dr. Bentley Robertson and he will be following the patient going forward and agrees with observation. Plan was to  remain available PRN but no planned follow-up scheduled with our clinic. Patient was referred back to us for newly diagnosed prostate cancer.    PSA TIMELINE   11/27/2019   2.24 ng/mL ([facility])  08/25/2020   1.78 ng/mL ([facility])  10/15/2021   1.85 ng/mL ([facility])  06/14/2023   6.72 ng/mL ([facility])  09/20/2023   6.63 ng/mL ([facility])     INTERVAL EVENTS:  6/14/2023: CT Pelvis w/ w/o contrast at [facility] for chronic prostatitis/prostate abscess showed enlarged prostate gland with persistent hypoattenuating foci and similar in size compared to prior exam dated 5/11/2023 and not present on prior exam dated 11/16/2022 and may be abscesses from prostatitis given the clinical history, no new foci, mild prominence of the wall of the bladder may be small obstruction and/or cystitis. PSA 6.72 ng/mL.  6/20/2023: Follow-up with Dr. Bentley Robertson ([facility]). Reviewed imaging and PSA. Discussed TURP with biopsy. Also discussed continuing to monitor and recheck PSA in 3 months. Patient started on doxycycline 100 mg BID for 45 days. Plan to recheck PSA in 3 months.  9/27/2023: Follow-up with Dr. Robertson. PSA results reviewed. Discussed and recommended prostate biopsy. Cystoscopy due in November. PSA 6.63 ng/mL.  10/10/2023: Prostate ultrasound with biopsy by Dr. John Barrios. Prostate volume 47 cc, seminal vesicles normal in size, shape, and configuration, capsular margin intact, no significant middle lobe seen, ultrasound evaluation of prostate showed no abnormalities, no prostatic calcifications. 6 biopsies taken on right and 6 on the left (12 total). Pathology revealed adenocarcinoma the prostate, [place] 6 (3+3) in 3 of 12 cores (right apex 1%, right base lateral 40%, right mid lateral 1%).  11/9/2023: CT AP with contrast at [facility] showed surgical changes of left lower pole partial nephrectomy without evidence of recurrence or metastatic disease within the abdomen or pelvis, slight decreased size of fluid density lesions  within the prostate likely reflecting the sequela of prior prostatitis and evolving abscesses. [facility] scan at [facility] showed several foci of radionucleotide uptake but overall the findings are unlikely to be due to metastatic disease. The subtle focus of uptake in the left pedicle region of T9 is barely visible and has an isolated spine abnormality unlikely to be the only manifestation of prostate bone malignancy.  11/28/2023: Follow-up with Dr. Robertson. Cystoscopy showed no bladder stones, foreign bodies, or tumors, trabeculation 1+, urethra normal, BPH present without median lobe, prostate length 3.0 cm. As for prostate cancer treatment, discussed AS with need for MRI and repeat biopsy every 3 years. Also discussed XRT though not recommended given young age, and DVP with possible risk for ED and leakage. Patient to RTO in 5 months with PSA, BMP, CXR, CT renal mass protocol prior and cystoscopy.    Next appointment with Dr. Robertson scheduled 5/8/2024 with imaging and labs 4/24/2024.        AUA SCORE: 19/35; Nocturia 3 times, more than half the time he has a weak-stream, about half the time--frequency and intermittency, less than half the time--incomplete emptying, urge to urinate, straining.   -He notes concern about urinary quality of life and notes that Desmopressin helps his nocturia only somewhat--without it he notes nocturia 6-7 times per night    IIEF: 18/25--currently not on any medication.    The following portions of the patient's history were reviewed and updated as appropriate: allergies, current medications, past family history, past medical history, past social history, past surgical history and problem list. Reviewed with the patient and remain unchanged.    PAST MEDICAL HISTORY:  he has a past medical history of Cancer (Not sure.), HTN (hypertension), Near syncope, and Sinus bradycardia.    MEDICATIONS:    Current Outpatient Medications:     amLODIPine (NORVASC) 5 MG tablet, Take 1 tablet by mouth Daily.,  Disp: 90 tablet, Rfl: 3    desmopressin (DDAVP) 0.2 MG tablet, TAKE 2 TABLETS BY MOUTH EVERY NIGHT AT BEDTIME AS DIRECTED, Disp: , Rfl:     lisinopril (PRINIVIL,ZESTRIL) 10 MG tablet, Take 1 tablet by mouth Daily., Disp: , Rfl:     nebivolol (BYSTOLIC) 5 MG tablet, Take 1 tablet by mouth Daily., Disp: , Rfl:     ALLERGIES:  No Known Allergies    PAST SURGICAL HISTORY:  he has a past surgical history that includes Knee arthroscopy (Right); Hand surgery (Left); and Kidney surgery.    PREVIOUS RADIOTHERAPY OR CHEMOTHERAPY:  no    FAMILY HISTORY:  hisfamily history includes Diabetes in his mother; Hypertension in his mother.  Family history on his father's side is unknown.    SOCIAL HISTORY:  he reports that he has never smoked. He has never used smokeless tobacco. He reports that he does not currently use alcohol. He reports that he does not currently use drugs.    PAIN AND PAIN MANAGEMENT:  denies pain.    NUTRITIONAL STATUS:   no issues    KPS:  90:  Minor signs or symptoms  PHQ-9 Total Score: distress screening tool completed.    REVIEW OF SYSTEMS:   General: No fevers, chills, weight change, or drenching night sweats. Skin: No rashes or jaundice.  HEENT: No change in vision or hearing, no headaches.  Neck: No dysphagia or masses.  Heme/Lymph: No easy bruising or bleeding.  Respiratory System: No shortness of breath or cough.  Cardiovascular: No chest pain, palpitations, or dyspnea on exertion.  - Pacemaker. GI: No nausea, vomiting, diarrhea, melena, or hematochezia.  : As noted above. No dysuria or hematuria.  Endocrine: No heat or cold intolerance. Musculoskeletal: No myalgias or arthralgias.  Neuro: No weakness, numbness, syncope, or seizures. Psych: No mood changes or depression. Ext: Denies swelling.    Objective   VITAL SIGNS:  Vitals:    12/04/23 1507   BP: 144/77   Pulse: 50   Resp: 18   Temp: 98.3 °F (36.8 °C)   SpO2: 98%         PHYSICAL EXAM:  GENERAL:  No apparent distress. Sitting comfortably in  room.    HEENT:  Normocephalic, atraumatic. Pupils are equal, round, reactive to light. Sclera anicteric. Conjunctiva not injected. Oropharynx without erythema, ulcerations or thrush.   NECK:  Supple with no masses.  LYMPHATIC:  No cervical, supraclavicular or axillary adenopathy appreciated bilaterally.   CARDIOVASCULAR:  S1 & S2 detected; no murmurs, rubs or gallops.  CHEST:  Clear to auscultation bilaterally; no wheezes, crackles or rubs. Work of breathing normal.  ABDOMEN:  Bowel sounds present. Abdomen is soft, nontender, nondistended.   MUSCULOSKELETAL:  No tenderness to palpation along the spine or scapulae. Normal range of motion.  EXTREMITIES:  No clubbing, cyanosis, edema.  SKIN:  No erythema, rashes, ulcerations noted.   NEUROLOGIC:  Cranial nerves II-XII grossly intact bilaterally. No focal neurologic deficits.  PSYCHIATRIC:  Alert, aware, and appropriate.      PERTINENT IMAGING/PATHOLOGY/LABS (Medical Decision Making):      COORDINATION OF CARE:  A copy of this note is sent to the referring provider.    PATHOLOGY: As noted above. Reviewed with patient.    IMAGING: As noted above. Reviewed with patient.    LABS (Reviewed):  HEMATOLOGY:  WBC   Date Value Ref Range Status   11/27/2023 3.93 3.40 - 10.80 10*3/mm3 Final   01/19/2023 8.99 4.5 - 11.0 10*3/uL Final     RBC   Date Value Ref Range Status   11/27/2023 4.46 4.14 - 5.80 10*6/mm3 Final   01/19/2023 4.17 (L) 4.5 - 5.9 10*6/uL Final     Hemoglobin   Date Value Ref Range Status   11/27/2023 13.4 13.0 - 17.7 g/dL Final   01/19/2023 12.3 (L) 13.5 - 17.5 g/dL Final     Hematocrit   Date Value Ref Range Status   11/27/2023 39.6 37.5 - 51.0 % Final   01/19/2023 36.2 (L) 41.0 - 53.0 % Final     Platelets   Date Value Ref Range Status   11/27/2023 221 140 - 450 10*3/mm3 Final   01/19/2023 228 140 - 440 10*3/uL Final     CHEMISTRY:  Lab Results   Component Value Date    GLUCOSE 83 07/11/2023    BUN 16 07/11/2023    CREATININE 1.26 07/11/2023    EGFRIFAFRI >60  01/19/2023    BCR 12.7 07/11/2023    K 3.9 07/11/2023    CO2 27.0 07/11/2023    CALCIUM 9.5 07/11/2023    ALBUMIN 4.1 03/29/2023    LABIL2 1.6 10/15/2021    AST 23 03/29/2023    ALT 18 03/29/2023     Assessment & Plan   ASSESSMENT AND PLAN:    1. Adenocarcinoma of prostate    2. 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      Again Mr. Garcia was following closely with [facility] in regards to his bladder and his kidney cancers and was noted to have a rising PSA.  This remained elevated on recheck and TRUS guided biopsies were recommended.  3/12 cores were positive.  MRI has not been performed.   -Patient is favoring Active surveillance or potentially brachytherapy (he has a friend that had brachytherapy with Dr. David Flores at  and is curious about this option).   -I reviewed need for interval PSA recheck in January at a time that will be 90 days from his biopsy on 10/10/2023.  I discussed that NCCN guidelines recommend confirmatory biopsy within 6-12 months of initial biopsy as part of active surveillance. I noted that MRI needs to be delayed to allow for biopsy changes/hemorrhage to cool down for accuracy of read. I discussed that Decipher testing then could be used to be a tool in Active surveillance vs. Primary management as well.   -MRI will also help in brachytherapy discussion.   -Again, patient does favor Active surveillance but discussions of surgery, VMAT XRT or brachytherapy was  discussed.    FU ordered for 1/2024 after repeat PSA check at that time.  Getting at [facility] lab for cross-valuation of lab result and interval assessment of PSA velocity.  If no significant PSA change, then will likely get MRI 3T Prostate protocol in 4/2024 prior to Urology f/u for active surveillance.    All questions answered.    This assessment comes from my review of the imaging, pathology, physician notes and other pertinent information as mentioned.          TIME SPENT WITH PATIENT:  I spent 32 minutes caring for Sen on this date of service. This time includes time spent by me in the following activities: preparing for the visit, reviewing tests, obtaining and/or reviewing a separately obtained history, performing a medically appropriate examination and/or evaluation, counseling and educating the patient/family/caregiver, ordering medications, tests, or procedures, referring and communicating with other health care professionals, documenting information in the medical record, independently interpreting results and communicating that information with the patient/family/caregiver, and care coordination           CC: Dianne Carpenter,* Hilaria Kimble, NP-C  Bentley Robertson MD        Approved by:     Randal Posadas MD

## 2024-01-08 ENCOUNTER — LAB (OUTPATIENT)
Dept: LAB | Facility: HOSPITAL | Age: 64
End: 2024-01-08
Payer: COMMERCIAL

## 2024-01-08 DIAGNOSIS — C61 ADENOCARCINOMA OF PROSTATE: ICD-10-CM

## 2024-01-08 LAB — PSA SERPL-MCNC: 5.3 NG/ML (ref 0–4)

## 2024-01-08 PROCEDURE — 36415 COLL VENOUS BLD VENIPUNCTURE: CPT

## 2024-01-08 PROCEDURE — 84153 ASSAY OF PSA TOTAL: CPT

## 2024-01-12 NOTE — PROGRESS NOTES
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    REASON FOR VISIT:  Re-evaluation after interval PSA    HISTORY OF PRESENT ILLNESS: The patient is a 63 y.o. year old male who was last seen in our office on 12/4/2023 for consultation for low risk prostate cancer. Patient was favoring active surveillance or potentially brachytherapy at that time. Planned to obtain repeat PSA in January which would be 90 days from his TRUP with biopsy on 10/10/2023. Discussed that NCCN guidelines recommend confirmatory biopsy within 6-12 months of initial biopsy as part of active surveillance as well as Decipher testing on tumor sample. Repeat PSA in 1/2024 ordered at [facility] for cross-evaluation. If no significant PSA change, planning for 3T MRI in 4/2024 for active surveillance prior to follow-up with [facility] on 5/8/2024 (imaging and labs 4/24/2024).    PSA TIMELINE   11/27/2019   2.24 ng/mL ([facility])  08/25/2020   1.78 ng/mL ([place])  10/15/2021   1.85 ng/mL ([place])  06/14/2023   6.72 ng/mL ([facility])  09/20/2023   6.63 ng/mL ([facility])  01/08/2024   5.30 ng/mL ()       AUA: 19/35  -2 points for incomplete emptying  -2 points for frequency  -4 points for intermittency  -2 points for  "urge to urinate  -4 points for weak stream  -3 points for nocturia.  \"Bother to Unhappy\"--These concerns have been going on for a while--Nocturia is greatest bother (desmopressin--improved but still not ideal)    IIEF/MRATHA: 16/25    Patient is still very interested in learning more about brachytherapy with Dr. David Flores vs. AS vs. RALP (he wants to meet the oncologic urologist at  as he is still very worried about his urinary quality of life and also discuss his recent RCC).    He denies any bone pain, ABISAI/GI bleeding or concerns      The following portions of the patient's history were reviewed and updated as appropriate: allergies, current medications, past family history, past medical history, past social history, past surgical history and problem list. Reviewed with the patient and remain unchanged.    PAST MEDICAL HISTORY:  he has a past medical history of Cancer (Not sure.), HTN (hypertension), Near syncope, and Sinus bradycardia. Left papillary renal cell carcinoma, Prostate Adenocarcinoma    MEDICATIONS:    Current Outpatient Medications:     amLODIPine (NORVASC) 5 MG tablet, Take 1 tablet by mouth Daily., Disp: 90 tablet, Rfl: 3    desmopressin (DDAVP) 0.2 MG tablet, TAKE 2 TABLETS BY MOUTH EVERY NIGHT AT BEDTIME AS DIRECTED, Disp: , Rfl:     lisinopril (PRINIVIL,ZESTRIL) 10 MG tablet, Take 1 tablet by mouth Daily., Disp: , Rfl:     nebivolol (BYSTOLIC) 5 MG tablet, Take 1 tablet by mouth Daily., Disp: , Rfl:     ALLERGIES:  No Known Allergies    PAST SURGICAL HISTORY:  he has a past surgical history that includes Knee arthroscopy (Right); Hand surgery (Left); and Kidney surgery.    PREVIOUS RADIOTHERAPY OR CHEMOTHERAPY:  No    FAMILY HISTORY:  hisfamily history includes Diabetes in his mother; Hypertension in his mother.    SOCIAL HISTORY:  he reports that he has never smoked. He has never used smokeless tobacco. He reports that he does not currently use alcohol. He reports that he does not currently " "use drugs.    PAIN AND PAIN MANAGEMENT:    Vitals:    01/15/24 1503   BP: 138/79   Pulse: 53   Resp: 18   Temp: 98.2 °F (36.8 °C)   TempSrc: Oral   SpO2: 99%   Weight: 86.5 kg (190 lb 12.8 oz)   Height: 170.2 cm (67\")   PainSc: 0-No pain       NUTRITIONAL STATUS:  no issues    KPS:  90:  Minor signs or symptoms      REVIEW OF SYSTEMS:   General: No fevers, chills, weight change, or drenching night sweats. Skin: No rashes or jaundice.  HEENT: No change in vision or hearing, no headaches.  Neck: No dysphagia or masses.  Heme/Lymph: No easy bruising or bleeding.  Respiratory System: No shortness of breath or cough.  Cardiovascular: No chest pain, palpitations, or dyspnea on exertion.  - Pacemaker. GI: No nausea, vomiting, diarrhea, melena, or hematochezia.  : As noted.  Endocrine: No heat or cold intolerance. Musculoskeletal: No myalgias or arthralgias.  Neuro: No weakness, numbness, syncope, or seizures. Psych: No mood changes or depression. Ext: Denies swelling.    Objective   VITAL SIGNS:  Vitals:    01/15/24 1503   BP: 138/79   Pulse: 53   Resp: 18   Temp: 98.2 °F (36.8 °C)   TempSrc: Oral   SpO2: 99%   Weight: 86.5 kg (190 lb 12.8 oz)   Height: 170.2 cm (67\")   PainSc: 0-No pain     PHYSICAL EXAM:  GENERAL: In no apparent distress, sitting comfortably in room.    HEENT: Normocephalic, atraumatic. Pupils are equal, round, reactive to light. Sclera anicteric. Conjunctiva not injected. Oropharynx without erythema, ulcerations or thrush.   NECK: Supple with no masses.  LYMPHATIC: No cervical, supraclavicular or axillary adenopathy appreciated bilaterally.   CARDIOVASCULAR: S1 & S2 detected; no murmurs, rubs or gallops.  CHEST: Clear to auscultation bilaterally; no wheezes, crackles or rubs. Work of breathing normal.  ABDOMEN: Bowel sounds present. Abdomen is soft, nontender, nondistended.   MUSCULOSKELETAL: No tenderness to palpation along the spine or scapulae. Normal range of motion.  EXTREMITIES: No clubbing, " cyanosis, edema.  SKIN: No erythema, rashes, ulcerations noted.   NEUROLOGIC: Cranial nerves II-XII grossly intact bilaterally. No focal neurologic deficits.  PSYCHIATRIC:  Alert, aware, and appropriate.      PERTINENT IMAGING/PATHOLOGY/LABS (Medical Decision Making):     COORDINATION OF CARE: A copy of this note is sent to the referring provider.    PATHOLOGY (Reviewed):     IMAGING (Reviewed):     LABS (Reviewed):  HEMATOLOGY:  WBC   Date Value Ref Range Status   11/27/2023 3.93 3.40 - 10.80 10*3/mm3 Final   01/19/2023 8.99 4.5 - 11.0 10*3/uL Final     RBC   Date Value Ref Range Status   11/27/2023 4.46 4.14 - 5.80 10*6/mm3 Final   01/19/2023 4.17 (L) 4.5 - 5.9 10*6/uL Final     Hemoglobin   Date Value Ref Range Status   11/27/2023 13.4 13.0 - 17.7 g/dL Final   01/19/2023 12.3 (L) 13.5 - 17.5 g/dL Final     Hematocrit   Date Value Ref Range Status   11/27/2023 39.6 37.5 - 51.0 % Final   01/19/2023 36.2 (L) 41.0 - 53.0 % Final     Platelets   Date Value Ref Range Status   11/27/2023 221 140 - 450 10*3/mm3 Final   01/19/2023 228 140 - 440 10*3/uL Final     CHEMISTRY:  Lab Results   Component Value Date    GLUCOSE 83 07/11/2023    BUN 16 07/11/2023    CREATININE 1.26 07/11/2023    EGFRIFAFRI >60 01/19/2023    BCR 12.7 07/11/2023    K 3.9 07/11/2023    CO2 27.0 07/11/2023    CALCIUM 9.5 07/11/2023    ALBUMIN 4.1 03/29/2023    LABIL2 1.6 10/15/2021    AST 23 03/29/2023    ALT 18 03/29/2023     Assessment & Plan   ASSESSMENT AND PLAN:    1. Adenocarcinoma of prostate    2. 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  Patient wants opinion from Dr. Ford Russell at .        Again Mr. Garcia was following closely with [facility] in regards to his bladder and his kidney cancers and was noted to have a rising PSA.  This remained elevated on recheck and TRUS guided biopsies were recommended.  3/12 cores were positive.  MRI has not been performed as we are waiting on interval greater than 3-4 months (to rule out biopsy related hemorrhage changes and will shoot for 6 months confirmatory MRI post biopsy).              -Patient is favoring Active surveillance or potentially brachytherapy (he has a friend that had brachytherapy with Dr. David Flores at  and is curious about this option).              -Interval PSA has decreased as noted above.   -Patient is still very interested in learning more about brachytherapy with Dr. David Flores vs. AS vs. RALP (he wants to meet the oncologic urologist at  as he is still very worried about his urinary quality of life and also discuss his recent RCC).  He also wants to talk about cyberknife option as well.      I reviewed need for interval PSA recheck in January at a time that will be 90 days from his biopsy on 10/10/2023.  I discussed that NCCN guidelines recommend confirmatory biopsy within 6-12 months of initial biopsy as part of active surveillance. I noted that MRI needs to be delayed to allow for biopsy changes/hemorrhage to cool down for accuracy of read. I discussed that Decipher testing then could be used to be a tool in Active surveillance vs. Primary management as well.              -MRI will also help in brachytherapy discussion.              -Again, patient does favor Active surveillance but discussions of surgery, VMAT XRT or brachytherapy was discussed.        All questions answered.      This assessment comes from my review of the imaging, pathology,  physician notes and other pertinent information as mentioned.    DISPOSITION: Referral to  at Patient request--Dr. David Flores and Dr. Ford Russell  3-4 month f/u here and MRI to be ordered before that f/u if not done at .  Patient may elect to transfer care to , he is undecided at this time.  TIME SPENT WITH PATIENT:   I spent 30 minutes caring for Sen on this date of service. This time includes time spent by me in the following activities: preparing for the visit, reviewing tests, obtaining and/or reviewing a separately obtained history, performing a medically appropriate examination and/or evaluation, counseling and educating the patient/family/caregiver, referring and communicating with other health care professionals, documenting information in the medical record, independently interpreting results and communicating that information with the patient/family/caregiver, and care coordination    CC: MD Randal Lei MD  1/15/2024  4:47 PM EST

## 2024-01-15 ENCOUNTER — OFFICE VISIT (OUTPATIENT)
Dept: RADIATION ONCOLOGY | Facility: HOSPITAL | Age: 64
End: 2024-01-15
Payer: COMMERCIAL

## 2024-01-15 VITALS
HEIGHT: 67 IN | DIASTOLIC BLOOD PRESSURE: 79 MMHG | WEIGHT: 190.8 LBS | TEMPERATURE: 98.2 F | RESPIRATION RATE: 18 BRPM | OXYGEN SATURATION: 99 % | SYSTOLIC BLOOD PRESSURE: 138 MMHG | HEART RATE: 53 BPM | BODY MASS INDEX: 29.95 KG/M2

## 2024-01-15 DIAGNOSIS — C61 ADENOCARCINOMA OF PROSTATE: Primary | ICD-10-CM

## 2024-01-15 DIAGNOSIS — C64.9 PAPILLARY RENAL CELL CARCINOMA: ICD-10-CM

## 2024-01-15 PROCEDURE — G0463 HOSPITAL OUTPT CLINIC VISIT: HCPCS | Performed by: RADIOLOGY

## 2024-01-19 NOTE — PROGRESS NOTES
Hematology/Oncology Outpatient Follow Up    PATIENT NAME:Sen Garcia  :1960  MRN: [id number]  PRIMARY CARE PHYSICIAN: Hilaria Kimble NP-C  REFERRING PHYSICIAN: No ref. provider found    Chief Complaint   Patient presents with    Follow-up     Renal cell carcinoma, unspecified laterality        HISTORY OF PRESENT ILLNESS:     This is a 62-year-old male who has referred secondary to kidney cancer.  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  In addition there was a partially exophytic hypodense lesion at the medial lower left pole measuring 1.2 cm.  There was evidence of prostatic enlargement measuring up to 4.7 cm otherwise there was no evidence of metastatic disease in the abdomen and pelvis.  The left lower pole 1.2 cm enhancing renal mass also shows for renal cell carcinoma with consideration for possible papillary or chromophobe subtype as seen patient then underwent a partial left nephrectomy performed by his urologist Dr. Luis Manuel Carmen on 2023.  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  Tumor was confined to renal parenchyma.  There was tumor involving the parenchymal margin.  Pathology stage is pT1a pNX M0     Scheduled for follow-up in first week in May 2023 during which time he will have CT scan performed by Dr. Luis Manuel Carmen.     He has recovered from his surgery.  He feels well and denies any abdominal pain, nausea vomiting or weight loss.     Patient has a history of superficial bladder cancer for which she will also be seen by Dr. Carmen in the near future.     He does not smoke     Family history of cancer cancer in a maternal Uncle age in his 50s. He does not recall any other family history     Patient is      Patient  works on cars    4/18/2023: Adjuvant XRT not recommended for positive parenchymal margin of resection by Dr. Posadas surveillance CT scans recommended  November 2023: Patient was found to have prostatic cancer Phill score 6  Past Medical History:   Diagnosis Date    Cancer Not sure.    Bladder  and left kidney.    HTN (hypertension)     Near syncope     Sinus bradycardia        Past Surgical History:   Procedure Laterality Date    HAND SURGERY Left     KIDNEY SURGERY      KNEE ARTHROSCOPY Right          Current Outpatient Medications:     amLODIPine (NORVASC) 5 MG tablet, Take 1 tablet by mouth Daily., Disp: 90 tablet, Rfl: 3    desmopressin (DDAVP) 0.2 MG tablet, TAKE 2 TABLETS BY MOUTH EVERY NIGHT AT BEDTIME AS DIRECTED, Disp: , Rfl:     lisinopril (PRINIVIL,ZESTRIL) 10 MG tablet, Take 1 tablet by mouth Daily., Disp: , Rfl:     nebivolol (BYSTOLIC) 5 MG tablet, Take 1 tablet by mouth Daily., Disp: , Rfl:     No Known Allergies    Family History   Problem Relation Age of Onset    Hypertension Mother     Diabetes Mother        Cancer-related family history is not on file.    Social History     Tobacco Use    Smoking status: Never    Smokeless tobacco: Never   Vaping Use    Vaping Use: Never used   Substance Use Topics    Alcohol use: Not Currently    Drug use: Not Currently       I have reviewed and confirmed the accuracy of the patient's history: Chief complaint, HPI, ROS, and Subjective as entered by the MA/LPN/RN. Dianne Carpenter MD 01/22/24          SUBJECTIVE:      He was recently diagnosed with prostatitis currently on antibiotics.    He has been found to have prostatic cancer M[place]urtain score 6 November 2023    Patient still trying to decide on how to manage his prostate cancer.  He will be getting a second opinion from Dr. Flores at [facility].          REVIEW OF SYSTEMS:  Review of Systems   Constitutional:  Negative for chills, fatigue and fever.   HENT:  Negative for congestion, drooling, ear discharge,  "rhinorrhea, sinus pressure and tinnitus.    Eyes:  Negative for photophobia, pain and discharge.   Respiratory:  Negative for apnea, choking and stridor.    Cardiovascular:  Negative for palpitations.   Gastrointestinal:  Negative for abdominal distention, abdominal pain and anal bleeding.   Endocrine: Negative for polydipsia and polyphagia.   Genitourinary:  Negative for decreased urine volume, flank pain and genital sores.   Musculoskeletal:  Negative for gait problem, neck pain and neck stiffness.   Skin:  Negative for color change, rash and wound.   Neurological:  Negative for tremors, seizures, syncope, facial asymmetry and speech difficulty.   Hematological:  Negative for adenopathy.   Psychiatric/Behavioral:  Negative for agitation, confusion, hallucinations and self-injury. The patient is not hyperactive.        OBJECTIVE:    Vitals:    01/22/24 1507   BP: 126/72   Pulse: 50   Resp: 18   Temp: 98 °F (36.7 °C)   TempSrc: Infrared   SpO2: 99%   Weight: 84.4 kg (186 lb)   Height: 170.2 cm (67\")   PainSc: 0-No pain         Body mass index is 29.13 kg/m².    ECOG  (0) Fully active, able to carry on all predisease performance without restriction    Physical Exam  Vitals and nursing note reviewed.   Constitutional:       General: He is not in acute distress.     Appearance: He is not diaphoretic.   HENT:      Head: Normocephalic and atraumatic.   Eyes:      General: No scleral icterus.        Right eye: No discharge.         Left eye: No discharge.      Conjunctiva/sclera: Conjunctivae normal.   Neck:      Thyroid: No thyromegaly.   Cardiovascular:      Rate and Rhythm: Normal rate and regular rhythm.      Heart sounds: Normal heart sounds.      No friction rub. No gallop.   Pulmonary:      Effort: Pulmonary effort is normal. No respiratory distress.      Breath sounds: No stridor. No wheezing.   Abdominal:      General: Bowel sounds are normal.      Palpations: Abdomen is soft. There is no mass.      Tenderness: " There is no abdominal tenderness. There is no guarding or rebound.   Musculoskeletal:         General: No tenderness. Normal range of motion.      Cervical back: Normal range of motion and neck supple.   Lymphadenopathy:      Cervical: No cervical adenopathy.   Skin:     General: Skin is warm.      Findings: No erythema or rash.   Neurological:      Mental Status: He is alert and oriented to person, place, and time.      Motor: No abnormal muscle tone.   Psychiatric:         Behavior: Behavior normal.     I have reexamined the patient and the results are consistent with the previously documented exam. Dianne Carpenter MD      RECENT LABS  WBC   Date Value Ref Range Status   01/22/2024 4.79 3.40 - 10.80 10*3/mm3 Final   01/19/2023 8.99 4.5 - 11.0 10*3/uL Final     RBC   Date Value Ref Range Status   01/22/2024 4.34 4.14 - 5.80 10*6/mm3 Final   01/19/2023 4.17 (L) 4.5 - 5.9 10*6/uL Final     Hemoglobin   Date Value Ref Range Status   01/22/2024 13.0 13.0 - 17.7 g/dL Final   01/19/2023 12.3 (L) 13.5 - 17.5 g/dL Final     Hematocrit   Date Value Ref Range Status   01/22/2024 38.8 37.5 - 51.0 % Final   01/19/2023 36.2 (L) 41.0 - 53.0 % Final     MCV   Date Value Ref Range Status   01/22/2024 89.4 79.0 - 97.0 fL Final   01/19/2023 86.8 80.0 - 100.0 fL Final     MCH   Date Value Ref Range Status   01/22/2024 30.0 26.6 - 33.0 pg Final   01/19/2023 29.5 26.0 - 34.0 pg Final     MCHC   Date Value Ref Range Status   01/22/2024 33.5 31.5 - 35.7 g/dL Final   01/19/2023 34.0 31.0 - 37.0 g/dL Final     RDW   Date Value Ref Range Status   01/22/2024 12.5 12.3 - 15.4 % Final   01/19/2023 12.4 12.0 - 16.8 % Final     RDW-SD   Date Value Ref Range Status   01/22/2024 40.4 37.0 - 54.0 fl Final     MPV   Date Value Ref Range Status   01/22/2024 10.2 6.0 - 12.0 fL Final   01/19/2023 11.5 8.4 - 12.4 fL Final     Platelets   Date Value Ref Range Status   01/22/2024 258 140 - 450 10*3/mm3 Final   01/19/2023 228 140 - 440 10*3/uL Final      Neutrophil Rel %   Date Value Ref Range Status   01/19/2023 80.0 45 - 80 % Final     Neutrophil %   Date Value Ref Range Status   01/22/2024 52.0 42.7 - 76.0 % Final     Lymphocyte Rel %   Date Value Ref Range Status   01/19/2023 12.1 (L) 15 - 50 % Final     Lymphocyte %   Date Value Ref Range Status   01/22/2024 36.3 19.6 - 45.3 % Final     Monocyte Rel %   Date Value Ref Range Status   01/19/2023 7.7 0 - 15 % Final     Monocyte %   Date Value Ref Range Status   01/22/2024 9.6 5.0 - 12.0 % Final     Eosinophil %   Date Value Ref Range Status   01/22/2024 1.3 0.3 - 6.2 % Final   01/19/2023 0.0 0 - 7 % Final     Basophil Rel %   Date Value Ref Range Status   01/19/2023 0.1 0 - 2 % Final     Basophil %   Date Value Ref Range Status   01/22/2024 0.8 0.0 - 1.5 % Final     Immature Grans %   Date Value Ref Range Status   01/19/2023 0.1 0.0 - 1.0 % Final     Neutrophils Absolute   Date Value Ref Range Status   01/19/2023 7.19 2.0 - 8.8 10*3/uL Final     Neutrophils, Absolute   Date Value Ref Range Status   01/22/2024 2.49 1.70 - 7.00 10*3/mm3 Final     Lymphocytes Absolute   Date Value Ref Range Status   01/19/2023 1.09 0.7 - 5.5 10*3/uL Final     Lymphocytes, Absolute   Date Value Ref Range Status   01/22/2024 1.74 0.70 - 3.10 10*3/mm3 Final     Monocytes Absolute   Date Value Ref Range Status   01/19/2023 0.69 0.0 - 1.7 10*3/uL Final     Monocytes, Absolute   Date Value Ref Range Status   01/22/2024 0.46 0.10 - 0.90 10*3/mm3 Final     Eosinophils Absolute   Date Value Ref Range Status   01/19/2023 0.00 0.0 - 0.8 10*3/uL Final     Eosinophils, Absolute   Date Value Ref Range Status   01/22/2024 0.06 0.00 - 0.40 10*3/mm3 Final     Basophils Absolute   Date Value Ref Range Status   01/19/2023 0.01 0.0 - 0.2 10*3/uL Final     Basophils, Absolute   Date Value Ref Range Status   01/22/2024 0.04 0.00 - 0.20 10*3/mm3 Final     Immature Grans, Absolute   Date Value Ref Range Status   01/19/2023 0.01 0.00 - 0.10 10*3/uL Final      [facility]   Date Value Ref Range Status   01/19/2023 0 0 /100(WBC) Final   05/17/2022 0.0 0.0 - 0.2 /100 WBC Final       Lab Results   Component Value Date    GLUCOSE 83 07/11/2023    BUN 16 07/11/2023    CREATININE 1.26 07/11/2023    EGFRIFAFRI >60 01/19/2023    BCR 12.7 07/11/2023    K 3.9 07/11/2023    CO2 27.0 07/11/2023    CALCIUM 9.5 07/11/2023    ALBUMIN 4.1 03/29/2023    LABIL2 1.6 10/15/2021    AST 23 03/29/2023    ALT 18 03/29/2023         Assessment & Plan     Renal cell carcinoma, unspecified laterality  - CBC & Differential          ASSESSMENT:      Renal cell carcinoma, unspecified laterality (HCC)  - CBC & Differential        Papillary renal cell carcinoma status post partial nephrectomy pT1 apN0 M0.  Positive margin of resection at the parenchymal margin.   Adjuvant XRT not recommended for surveillance CT scans   History of bladder cancer, status post TURBT and followed by Dr. Luis Manuel Carmen  Prostatic carcinoma PSA 6 [place] score 6  Patient has had 3 separate urinary tract malignancies family history of cancer in a maternal uncle type unknown.  I have recommended genetic testing for him.  Patient is interested in pursuing cancer genetics  Genetic counseling and testing  Bradycardia on Bystolic.  Rate in the 40s.  I have given him referral to cardiologist          Discussion      Today's risk assessment is based on the history the patient has provided.  We discussed that the best people to screen are the ones who have been affected by malignancy as their result is more informative.  We reviewed all the hallmarks of hereditary cancer syndrome including multiple relatives on the same side of the family being affected by malignancy, cancer diagnosis in young individuals, different cancers in one individual.      We discussed the implications of a positive deleterious mutation which can result in recommendations for prophylactic surgeries, chemoprevention, lifestyle modifications and aggressive screening  with mammogram and MRI, and also increased breast awareness and breast self exams.  Patient understands if she screens positive, then at-risk family members will need to be screened as well.  Each offspring has a 50% chance of inheriting a deleterious mutation if positive in a parent.      A negative deleterious mutation will make hereditary cancer syndrome much less likely, but does not rule out the possibility of a gene mutation that cannot be detected with the available technology. He also understands that a negative gene test result might indicate that the cancers that occurred in her family were most likely sporadic, or even if there is a mutation the patient did not inherit it.     We discussed variant of unknown significance.  Patient understands that no medical decisions will be made based on a variant of unknown significance, but I did stress the importance of maintaining contact information with us should this be the case.      We discussed the financial implications of the above testing.  Patient understands when the right clinical criteria are met that most insurance companies will cover the cost.      We also discussed the various insurability issues with a deleterious mutation result.     Patient has give us consent to proceed with comprehensive genetic analysis.           Plans         Genetic testing with [place] aden  Follow-up 6 weeks  Follow-up urology and radiation oncologist  Requested for CT scans chest abdomen pelvis as well as bone scan [facility] 2023  All questions answered  Creatinine 1.0     Patient verbalized understanding and is in agreement of the above plan.         I spent 40 total minutes, face-to-face, caring for Sen aden. 90% of this time involved counseling and/or coordination of care as documented within this note.

## 2024-01-22 ENCOUNTER — LAB (OUTPATIENT)
Dept: LAB | Facility: HOSPITAL | Age: 64
End: 2024-01-22
Payer: COMMERCIAL

## 2024-01-22 ENCOUNTER — OFFICE VISIT (OUTPATIENT)
Dept: ONCOLOGY | Facility: CLINIC | Age: 64
End: 2024-01-22
Payer: COMMERCIAL

## 2024-01-22 VITALS
RESPIRATION RATE: 18 BRPM | BODY MASS INDEX: 29.19 KG/M2 | OXYGEN SATURATION: 99 % | DIASTOLIC BLOOD PRESSURE: 72 MMHG | WEIGHT: 186 LBS | HEIGHT: 67 IN | TEMPERATURE: 98 F | HEART RATE: 50 BPM | SYSTOLIC BLOOD PRESSURE: 126 MMHG

## 2024-01-22 DIAGNOSIS — C64.9 RENAL CELL CARCINOMA, UNSPECIFIED LATERALITY: Primary | ICD-10-CM

## 2024-01-22 LAB
BASOPHILS # BLD AUTO: 0.04 10*3/MM3 (ref 0–0.2)
BASOPHILS NFR BLD AUTO: 0.8 % (ref 0–1.5)
DEPRECATED RDW RBC AUTO: 40.4 FL (ref 37–54)
EOSINOPHIL # BLD AUTO: 0.06 10*3/MM3 (ref 0–0.4)
EOSINOPHIL NFR BLD AUTO: 1.3 % (ref 0.3–6.2)
ERYTHROCYTE [DISTWIDTH] IN BLOOD BY AUTOMATED COUNT: 12.5 % (ref 12.3–15.4)
HCT VFR BLD AUTO: 38.8 % (ref 37.5–51)
HGB BLD-MCNC: 13 G/DL (ref 13–17.7)
HOLD SPECIMEN: NORMAL
HOLD SPECIMEN: NORMAL
LYMPHOCYTES # BLD AUTO: 1.74 10*3/MM3 (ref 0.7–3.1)
LYMPHOCYTES NFR BLD AUTO: 36.3 % (ref 19.6–45.3)
MCH RBC QN AUTO: 30 PG (ref 26.6–33)
MCHC RBC AUTO-ENTMCNC: 33.5 G/DL (ref 31.5–35.7)
MCV RBC AUTO: 89.4 FL (ref 79–97)
MONOCYTES # BLD AUTO: 0.46 10*3/MM3 (ref 0.1–0.9)
MONOCYTES NFR BLD AUTO: 9.6 % (ref 5–12)
NEUTROPHILS NFR BLD AUTO: 2.49 10*3/MM3 (ref 1.7–7)
NEUTROPHILS NFR BLD AUTO: 52 % (ref 42.7–76)
PLATELET # BLD AUTO: 258 10*3/MM3 (ref 140–450)
PMV BLD AUTO: 10.2 FL (ref 6–12)
RBC # BLD AUTO: 4.34 10*6/MM3 (ref 4.14–5.8)
WBC NRBC COR # BLD AUTO: 4.79 10*3/MM3 (ref 3.4–10.8)

## 2024-01-22 PROCEDURE — 85025 COMPLETE CBC W/AUTO DIFF WBC: CPT

## 2024-01-22 PROCEDURE — 99215 OFFICE O/P EST HI 40 MIN: CPT | Performed by: INTERNAL MEDICINE

## 2024-01-22 PROCEDURE — 36415 COLL VENOUS BLD VENIPUNCTURE: CPT

## 2024-01-29 ENCOUNTER — TELEPHONE (OUTPATIENT)
Dept: RADIATION ONCOLOGY | Facility: HOSPITAL | Age: 64
End: 2024-01-29
Payer: COMMERCIAL

## 2024-01-29 NOTE — TELEPHONE ENCOUNTER
Radiation Oncology Nurse Note    Received faxed notification that patient has been scheduled for consultation appointment with Dr. David Flores on 2/7/2024 at 15:00. Notification stated patient was made aware of appointment and has their direct office number to call with any questions or concerns.    Vicky Antunez RN, BSN  Radiation Oncology Department  [facility]  [phone]  Office  [phone]  Fax

## 2024-04-03 ENCOUNTER — TELEPHONE (OUTPATIENT)
Dept: RADIATION ONCOLOGY | Facility: HOSPITAL | Age: 64
End: 2024-04-03
Payer: COMMERCIAL

## 2024-04-03 NOTE — TELEPHONE ENCOUNTER
Cuauhtemoc said to call if decide to follow w/ us. Cuauhtemoc said he would order MRI b4 his f/u in April.     4/3/24- Abdiel has cx appt. He says he is going to continue to see Dr Flores at [facility].

## 2024-07-29 ENCOUNTER — OFFICE VISIT (OUTPATIENT)
Dept: CARDIOLOGY | Facility: CLINIC | Age: 64
End: 2024-07-29
Payer: COMMERCIAL

## 2024-07-29 VITALS
SYSTOLIC BLOOD PRESSURE: 142 MMHG | HEIGHT: 67 IN | WEIGHT: 181 LBS | DIASTOLIC BLOOD PRESSURE: 81 MMHG | HEART RATE: 51 BPM | BODY MASS INDEX: 28.41 KG/M2 | OXYGEN SATURATION: 98 %

## 2024-07-29 DIAGNOSIS — R00.1 BRADYCARDIA: Primary | ICD-10-CM

## 2024-07-29 DIAGNOSIS — I10 ESSENTIAL HYPERTENSION: ICD-10-CM

## 2024-07-29 DIAGNOSIS — E78.5 DYSLIPIDEMIA: ICD-10-CM

## 2024-07-29 LAB
ALBUMIN SERPL-MCNC: 4.3 G/DL (ref 3.5–5.2)
ALBUMIN/GLOB SERPL: 1.6 G/DL
ALP SERPL-CCNC: 66 U/L (ref 39–117)
ALT SERPL W P-5'-P-CCNC: 22 U/L (ref 1–41)
ANION GAP SERPL CALCULATED.3IONS-SCNC: 7.9 MMOL/L (ref 5–15)
AST SERPL-CCNC: 20 U/L (ref 1–40)
BILIRUB SERPL-MCNC: 0.3 MG/DL (ref 0–1.2)
BUN SERPL-MCNC: 12 MG/DL (ref 8–23)
BUN/CREAT SERPL: 9.8 (ref 7–25)
CALCIUM SPEC-SCNC: 9.2 MG/DL (ref 8.6–10.5)
CHLORIDE SERPL-SCNC: 104 MMOL/L (ref 98–107)
CHOLEST SERPL-MCNC: 190 MG/DL (ref 0–200)
CO2 SERPL-SCNC: 25.1 MMOL/L (ref 22–29)
CREAT SERPL-MCNC: 1.22 MG/DL (ref 0.76–1.27)
EGFRCR SERPLBLD CKD-EPI 2021: 67.5 ML/MIN/1.73
GLOBULIN UR ELPH-MCNC: 2.7 GM/DL
GLUCOSE SERPL-MCNC: 79 MG/DL (ref 65–99)
HDLC SERPL-MCNC: 41 MG/DL (ref 40–60)
LDLC SERPL CALC-MCNC: 131 MG/DL (ref 0–100)
LDLC/HDLC SERPL: 3.17 {RATIO}
POTASSIUM SERPL-SCNC: 4 MMOL/L (ref 3.5–5.2)
PROT SERPL-MCNC: 7 G/DL (ref 6–8.5)
SODIUM SERPL-SCNC: 137 MMOL/L (ref 136–145)
TRIGL SERPL-MCNC: 96 MG/DL (ref 0–150)
VLDLC SERPL-MCNC: 18 MG/DL (ref 5–40)

## 2024-07-29 PROCEDURE — 99214 OFFICE O/P EST MOD 30 MIN: CPT | Performed by: INTERNAL MEDICINE

## 2024-07-29 PROCEDURE — 80053 COMPREHEN METABOLIC PANEL: CPT | Performed by: INTERNAL MEDICINE

## 2024-07-29 PROCEDURE — 36415 COLL VENOUS BLD VENIPUNCTURE: CPT | Performed by: INTERNAL MEDICINE

## 2024-07-29 PROCEDURE — 93000 ELECTROCARDIOGRAM COMPLETE: CPT | Performed by: INTERNAL MEDICINE

## 2024-07-29 PROCEDURE — 80061 LIPID PANEL: CPT | Performed by: INTERNAL MEDICINE

## 2024-07-29 RX ORDER — PRAVASTATIN SODIUM 20 MG
20 TABLET ORAL DAILY
Qty: 90 TABLET | Refills: 3 | Status: SHIPPED | OUTPATIENT
Start: 2024-07-29

## 2024-07-29 RX ORDER — AMLODIPINE BESYLATE 10 MG/1
10 TABLET ORAL DAILY
Qty: 90 TABLET | Refills: 3 | Status: SHIPPED | OUTPATIENT
Start: 2024-07-29

## 2024-07-29 NOTE — PROGRESS NOTES
Subjective:     Encounter Date:07/29/2024      Patient ID: Sen Garcia is a 63 y.o. male.    Chief Complaint and history of present illness:       Follow-up for hypertension, bradycardia, vasovagal syncope     History of Present Illness:       Mr. Sen Garcia has PMH of     Hypertension  Bradycardia  History of vasovagal syncope 7/10/2018-while working in [place]  Bladder cancer, papillary renal cell CA, surgery  Non-smoker  Intolerant to hydrochlorothiazide caused him to develop gout  Family history of diabetes and hypertension in mother        Here for follow-up.  Patient was seen for evaluation and treatment of bradycardia.  Patient states his heart rate was low all his life, denies any chest pain shortness of breath palpitations lightheadedness dizziness loss of consciousness.  Patient's Bystolic was decreased and he was put on 5 mg of amlodipine and continued his lisinopril is here for follow-up.  Patient continues to be asymptomatic.  Blood pressures are good.     Patient's arterial blood pressure is 142/81, heart rate 51, O2 sat of 98% on room air.     Data:  EKG done 4/7/2021 reveals bradycardia at the rate of 50 bpm.  EKG from 5/17/2022 revealed EKG with sinus bradycardia at the rate of 51 bpm with LVH  Echocardiogram 7/14/2023 reveals EF of 60 to 65% with interatrial septum aneurysm.     Labs from 10/15/2021 reveal lipid profile with cholesterol 219, triglycerides 96, HDL 40, .  CMP 3/29/2023 was normal.  CBC from 5/24/2023 is normal.  Labs from 7/11/2023 revealed normal TSH at 0.875 with normal proBNP of 57.  Normal BMP.  Labs from 1/22/2024 reveal normal CBC.        Assessment:  :     Bradycardia  Hypertension  Dyslipidemia  Atrial septal aneurysm  History of papillary renal cell ca        Recommendations / Plan:         Reviewed EKG results with patient.  Patient's blood pressure is elevated will increase amlodipine to 10 mg.  Patient has elevated cholesterol we will start him on  pravastatin after checking labs and recheck in 6 months.  We will continue medical management with amlodipine and lisinopril and Bystolic, pravastatin as tolerated.  We will follow-up in cardiology clinic.               ECG 12 Lead    Date/Time: 7/29/2024 4:11 PM  Performed by: Jordan Garcia MD    Authorized by: Jordan Garcia MD  Comparison: compared with previous ECG from 6/22/2023  Comparison to previous ECG: EKG done today reviewed/elevated by me reveals sinus bradycardia with rate of 50 bpm with T wave inversions in inferolateral leads, no significant change compared EKG from 6/22/2023        Copied text in this portion of the note has been reviewed and is accurate as of 7/29/2024  The following portions of the patient's history were reviewed and updated as appropriate: allergies, current medications, past family history, past medical history, past social history, past surgical history and problem list.    Assessment:         MDM       Diagnosis Plan   1. Bradycardia  Comprehensive Metabolic Panel    Lipid Panel    Comprehensive Metabolic Panel    Lipid Panel      2. Essential hypertension  Comprehensive Metabolic Panel    Lipid Panel    Comprehensive Metabolic Panel    Lipid Panel      3. Dyslipidemia  Comprehensive Metabolic Panel    Lipid Panel    Comprehensive Metabolic Panel    Lipid Panel             Plan:               Past Medical History:  Past Medical History:   Diagnosis Date    Cancer Not sure.    Bladder  and left kidney.    HTN (hypertension)     Near syncope     Sinus bradycardia      Past Surgical History:  Past Surgical History:   Procedure Laterality Date    HAND SURGERY Left     KIDNEY SURGERY      KNEE ARTHROSCOPY Right       Allergies:  No Known Allergies  Home Meds:  Current Meds:     Current Outpatient Medications:     amLODIPine (NORVASC) 10 MG tablet, Take 1 tablet by mouth Daily., Disp: 90 tablet, Rfl: 3    desmopressin (DDAVP) 0.2 MG tablet, TAKE 2 TABLETS BY  "MOUTH EVERY NIGHT AT BEDTIME AS DIRECTED, Disp: , Rfl:     lisinopril (PRINIVIL,ZESTRIL) 10 MG tablet, Take 1 tablet by mouth Daily., Disp: , Rfl:     nebivolol (BYSTOLIC) 5 MG tablet, Take 1 tablet by mouth Daily., Disp: , Rfl:     pravastatin (Pravachol) 20 MG tablet, Take 1 tablet by mouth Daily., Disp: 90 tablet, Rfl: 3  Social History:   Social History     Tobacco Use    Smoking status: Never     Passive exposure: Never    Smokeless tobacco: Never   Substance Use Topics    Alcohol use: Not Currently      Family History:  Family History   Problem Relation Age of Onset    Hypertension Mother     Diabetes Mother               Review of Systems   Cardiovascular:  Negative for chest pain, leg swelling and palpitations.   Respiratory:  Negative for shortness of breath.    Neurological:  Negative for dizziness and numbness.     All other systems are negative         Objective:     Physical Exam  /81 (BP Location: Left arm, Patient Position: Sitting, Cuff Size: Adult)   Pulse 51   Ht 170.2 cm (67\")   Wt 82.1 kg (181 lb)   SpO2 98%   BMI 28.35 kg/m²   General:  Appears in no acute distress  Eyes: Sclera is anicteric,  conjunctiva is clear   HEENT:  No JVD.  No carotid bruits  Respiratory: Respirations regular and unlabored at rest.  Clear to auscultation  Cardiovascular: S1,S2 Regular rate and rhythm. .   Extremities: No digital clubbing or cyanosis, no edema  Skin: Color pink. Skin warm and dry to touch. No rashes  No xanthoma  Neuro: Alert and awake.    Lab Reviewed:         Jordan Garcia MD  7/29/2024 16:18 EDT      EMR Dragon/Transcription:   \"Dictated utilizing Dragon dictation\".        "

## 2024-11-26 RX ORDER — PRAVASTATIN SODIUM 20 MG
20 TABLET ORAL DAILY
Qty: 90 TABLET | Refills: 0 | Status: SHIPPED | OUTPATIENT
Start: 2024-11-26

## 2024-11-26 RX ORDER — AMLODIPINE BESYLATE 10 MG/1
10 TABLET ORAL DAILY
Qty: 90 TABLET | Refills: 0 | Status: SHIPPED | OUTPATIENT
Start: 2024-11-26

## 2024-11-26 NOTE — TELEPHONE ENCOUNTER
Rx Refill Note  Requested Prescriptions     Pending Prescriptions Disp Refills    amLODIPine (NORVASC) 10 MG tablet 90 tablet 0     Sig: Take 1 tablet by mouth Daily.    pravastatin (Pravachol) 20 MG tablet 90 tablet 0     Sig: Take 1 tablet by mouth Daily.      Last office visit with prescribing clinician: 7/29/2024   Last telemedicine visit with prescribing clinician: Visit date not found   Next office visit with prescribing clinician: 7/31/2025                         Would you like a call back once the refill request has been completed: [] Yes [] No    If the office needs to give you a call back, can they leave a voicemail: [] Yes [] No    Susie Chandler MA  11/26/24, 10:13 EST

## 2025-01-25 ENCOUNTER — LAB (OUTPATIENT)
Dept: LAB | Facility: HOSPITAL | Age: 65
End: 2025-01-25
Payer: COMMERCIAL

## 2025-01-25 DIAGNOSIS — I10 ESSENTIAL HYPERTENSION: ICD-10-CM

## 2025-01-25 DIAGNOSIS — E78.5 DYSLIPIDEMIA: ICD-10-CM

## 2025-01-25 DIAGNOSIS — R00.1 BRADYCARDIA: ICD-10-CM

## 2025-01-25 LAB
CHOLEST SERPL-MCNC: 169 MG/DL (ref 0–200)
HDLC SERPL-MCNC: 40 MG/DL (ref 40–60)
LDLC SERPL CALC-MCNC: 115 MG/DL (ref 0–100)
LDLC/HDLC SERPL: 2.86 {RATIO}
TRIGL SERPL-MCNC: 73 MG/DL (ref 0–150)
VLDLC SERPL-MCNC: 14 MG/DL (ref 5–40)

## 2025-01-25 PROCEDURE — 80061 LIPID PANEL: CPT

## 2025-01-25 PROCEDURE — 36415 COLL VENOUS BLD VENIPUNCTURE: CPT

## 2025-02-06 ENCOUNTER — OFFICE VISIT (OUTPATIENT)
Dept: CARDIOLOGY | Facility: CLINIC | Age: 65
End: 2025-02-06
Payer: COMMERCIAL

## 2025-02-06 VITALS
WEIGHT: 185 LBS | HEART RATE: 49 BPM | HEIGHT: 67 IN | SYSTOLIC BLOOD PRESSURE: 114 MMHG | DIASTOLIC BLOOD PRESSURE: 65 MMHG | OXYGEN SATURATION: 99 % | BODY MASS INDEX: 29.03 KG/M2

## 2025-02-06 DIAGNOSIS — R00.1 SINUS BRADYCARDIA: ICD-10-CM

## 2025-02-06 DIAGNOSIS — I10 PRIMARY HYPERTENSION: Primary | ICD-10-CM

## 2025-02-06 DIAGNOSIS — E78.5 DYSLIPIDEMIA: ICD-10-CM

## 2025-02-06 PROCEDURE — 99213 OFFICE O/P EST LOW 20 MIN: CPT | Performed by: NURSE PRACTITIONER

## 2025-02-06 RX ORDER — ERGOCALCIFEROL 1.25 MG/1
50000 CAPSULE, LIQUID FILLED ORAL
COMMUNITY

## 2025-02-06 RX ORDER — PRAVASTATIN SODIUM 40 MG
40 TABLET ORAL DAILY
Qty: 90 TABLET | Refills: 3 | Status: SHIPPED | OUTPATIENT
Start: 2025-02-06

## 2025-02-06 NOTE — PROGRESS NOTES
Subjective:     Encounter Date:02/06/2025      Patient ID: Sen Garcia is a 64 y.o. male.    Chief Complaint: 6 month follow up with labs hypertension, bradycardia     History of Present Illness    Mr. Sen Garcia has PMH of     Hypertension  Bradycardia, asymptomatic   History of vasovagal syncope 7/10/2018-while working in hot [place]  Bladder cancer, papillary renal cell CA, surgery  Non-smoker  Intolerant to hydrochlorothiazide caused him to develop gout  Family history of diabetes and hypertension in mother     Here for 6 month follow up and labs results.  Patient denies any chest pain, shortness of breath, lower extremity edema or dizziness/lightheadedness.  Patient has bradycardia and has been most of his life.      Blood pressure today is 114/65  HR 49  oxygen 99% on room air  weight 185 lbs       Echocardiogram from 2023 reviewed and unremarkable  Stress test from 2019 reviewed and negative for ischemia    Lab Review:     Labs from 1/25/2025 HDL 40     The following portions of the patient's history were reviewed and updated as appropriate: allergies, current medications, past family history, past medical history, past social history, past surgical history, and problem list.      Review of Systems   Constitutional: Negative for malaise/fatigue.   Cardiovascular:  Negative for chest pain, leg swelling, palpitations and syncope.   Respiratory:  Negative for shortness of breath.    Skin:  Negative for rash.   Gastrointestinal:  Negative for nausea and vomiting.   Neurological:  Negative for dizziness, light-headedness and numbness.   All other systems reviewed and are negative.    Past Medical History:   Diagnosis Date    Cancer Not sure.    Bladder  and left kidney.    Dyslipidemia 2/6/2025    HTN (hypertension)     Near syncope     Sinus bradycardia      Past Surgical History:   Procedure Laterality Date    HAND SURGERY Left     KIDNEY SURGERY      KNEE ARTHROSCOPY Right      /65 (BP  "Location: Left arm, Patient Position: Sitting, Cuff Size: Adult)   Pulse (!) 49   Ht 170.2 cm (67\")   Wt 83.9 kg (185 lb)   SpO2 99%   BMI 28.98 kg/m²   Family History   Problem Relation Age of Onset    Hypertension Mother     Diabetes Mother        Current Outpatient Medications:     amLODIPine (NORVASC) 10 MG tablet, Take 1 tablet by mouth Daily., Disp: 90 tablet, Rfl: 0    lisinopril (PRINIVIL,ZESTRIL) 10 MG tablet, Take 1 tablet by mouth Daily., Disp: , Rfl:     nebivolol (BYSTOLIC) 5 MG tablet, Take 1 tablet by mouth Daily., Disp: , Rfl:     pravastatin (Pravachol) 40 MG tablet, Take 1 tablet by mouth Daily., Disp: 90 tablet, Rfl: 3    vitamin D (ERGOCALCIFEROL) 1.25 MG (81399 UT) capsule capsule, Take 1 capsule by mouth Every 7 (Seven) Days., Disp: , Rfl:     No Known Allergies    Social History     Socioeconomic History    Marital status:    Tobacco Use    Smoking status: Never     Passive exposure: Never    Smokeless tobacco: Never   Vaping Use    Vaping status: Never Used   Substance and Sexual Activity    Alcohol use: Not Currently    Drug use: Never    Sexual activity: Not Currently     Partners: Female                Objective:     Vitals reviewed.   Constitutional:       Appearance: Healthy appearance. Not in distress.   Neck:      Vascular: No JVR. JVD normal.   Pulmonary:      Effort: Pulmonary effort is normal.      Breath sounds: Normal breath sounds. No wheezing. No rhonchi. No rales.   Chest:      Chest wall: Not tender to palpatation.   Cardiovascular:      PMI at left midclavicular line. Normal rate. Regular rhythm. Normal S1. Normal S2.       Murmurs: There is no murmur.      No gallop.  No click. No rub.   Pulses:     Intact distal pulses.   Edema:     Peripheral edema absent.   Abdominal:      General: Bowel sounds are normal.      Palpations: Abdomen is soft.      Tenderness: There is no abdominal tenderness.   Musculoskeletal: Normal range of motion.         General: No " tenderness. Skin:     General: Skin is warm and dry.   Neurological:      General: No focal deficit present.      Mental Status: Alert and oriented to person, place and time.       Procedures                  Assessment:     MDM       Diagnosis Plan   1. Primary hypertension        2. Sinus bradycardia        3. Dyslipidemia                       Plan:   Chart reviewed  Labs reviewed lipid panel improved but LDL still mildly elevated  Increase pravastatin to 40 mg daily  Advised patient that if he has any symptoms/side effects such as joint pain muscle pain to decrease back to 20 mg daily.    Will repeat labs in 6 months and follow-up with Dr. Garcia    Continue blood pressure control with amlodipine 10 mg daily, lisinopril 10 mg daily and nebivolol 5 mg daily      Electronically signed by GREGORIO Perez, 02/06/25, 3:43 PM EST.        This document is intended for medical expert use only.  Reading of this document by patients and/or patient's family without participating medical staff guidance may result in misinterpretation and unintended morbidity. Any interpretation of such data is the responsibility of the patient and/or family member responsible for the patient in concert with their primary or specialist providers, not to be left for sources of online search as such as [employer], [employer] or similar queries.  Relying on these approaches to knowledge may result in misinterpretation, misguided goals of care and even death should patient or family members try recommendations outside of the realm of professional medical care in a supervised inpatient environment.

## 2025-07-16 ENCOUNTER — TELEPHONE (OUTPATIENT)
Dept: CARDIOLOGY | Facility: CLINIC | Age: 65
End: 2025-07-16
Payer: COMMERCIAL

## 2025-07-29 ENCOUNTER — LAB (OUTPATIENT)
Dept: LAB | Facility: HOSPITAL | Age: 65
End: 2025-07-29
Payer: COMMERCIAL

## 2025-07-29 DIAGNOSIS — R00.1 BRADYCARDIA: ICD-10-CM

## 2025-07-29 DIAGNOSIS — E78.5 DYSLIPIDEMIA: ICD-10-CM

## 2025-07-29 DIAGNOSIS — I10 PRIMARY HYPERTENSION: ICD-10-CM

## 2025-07-29 DIAGNOSIS — I10 ESSENTIAL HYPERTENSION: ICD-10-CM

## 2025-07-29 LAB
ALBUMIN SERPL-MCNC: 4.7 G/DL (ref 3.5–5.2)
ALBUMIN/GLOB SERPL: 2.5 G/DL
ALP SERPL-CCNC: 57 U/L (ref 39–117)
ALT SERPL W P-5'-P-CCNC: 32 U/L (ref 1–41)
ANION GAP SERPL CALCULATED.3IONS-SCNC: 8.5 MMOL/L (ref 5–15)
AST SERPL-CCNC: 25 U/L (ref 1–40)
BILIRUB SERPL-MCNC: 0.6 MG/DL (ref 0–1.2)
BUN SERPL-MCNC: 14.9 MG/DL (ref 8–23)
BUN/CREAT SERPL: 11.9 (ref 7–25)
CALCIUM SPEC-SCNC: 9.2 MG/DL (ref 8.6–10.5)
CHLORIDE SERPL-SCNC: 105 MMOL/L (ref 98–107)
CHOLEST SERPL-MCNC: 151 MG/DL (ref 0–200)
CO2 SERPL-SCNC: 26.5 MMOL/L (ref 22–29)
CREAT SERPL-MCNC: 1.25 MG/DL (ref 0.76–1.27)
EGFRCR SERPLBLD CKD-EPI 2021: 65.1 ML/MIN/1.73
GLOBULIN UR ELPH-MCNC: 1.9 GM/DL
GLUCOSE SERPL-MCNC: 81 MG/DL (ref 65–99)
HDLC SERPL-MCNC: 45 MG/DL (ref 40–60)
LDLC SERPL CALC-MCNC: 93 MG/DL (ref 0–100)
LDLC/HDLC SERPL: 2.07 {RATIO}
POTASSIUM SERPL-SCNC: 4.6 MMOL/L (ref 3.5–5.2)
PROT SERPL-MCNC: 6.6 G/DL (ref 6–8.5)
SODIUM SERPL-SCNC: 140 MMOL/L (ref 136–145)
TRIGL SERPL-MCNC: 64 MG/DL (ref 0–150)
VLDLC SERPL-MCNC: 13 MG/DL (ref 5–40)

## 2025-07-29 PROCEDURE — 36415 COLL VENOUS BLD VENIPUNCTURE: CPT

## 2025-07-29 PROCEDURE — 80061 LIPID PANEL: CPT

## 2025-07-29 PROCEDURE — 80053 COMPREHEN METABOLIC PANEL: CPT

## 2025-07-31 ENCOUNTER — OFFICE VISIT (OUTPATIENT)
Dept: CARDIOLOGY | Facility: CLINIC | Age: 65
End: 2025-07-31
Payer: COMMERCIAL

## 2025-07-31 VITALS
SYSTOLIC BLOOD PRESSURE: 141 MMHG | WEIGHT: 181 LBS | DIASTOLIC BLOOD PRESSURE: 72 MMHG | HEART RATE: 45 BPM | HEIGHT: 67 IN | BODY MASS INDEX: 28.41 KG/M2 | OXYGEN SATURATION: 98 %

## 2025-07-31 DIAGNOSIS — R00.1 BRADYCARDIA: Primary | ICD-10-CM

## 2025-07-31 DIAGNOSIS — I10 ESSENTIAL HYPERTENSION: ICD-10-CM

## 2025-07-31 DIAGNOSIS — E78.5 DYSLIPIDEMIA: ICD-10-CM

## 2025-07-31 PROCEDURE — 93000 ELECTROCARDIOGRAM COMPLETE: CPT | Performed by: INTERNAL MEDICINE

## 2025-07-31 PROCEDURE — 99214 OFFICE O/P EST MOD 30 MIN: CPT | Performed by: INTERNAL MEDICINE

## 2025-07-31 RX ORDER — LISINOPRIL 40 MG/1
40 TABLET ORAL DAILY
Qty: 90 TABLET | Refills: 3 | Status: SHIPPED | OUTPATIENT
Start: 2025-07-31

## 2025-07-31 NOTE — PROGRESS NOTES
Subjective:     Encounter Date:07/31/2025      Patient ID: Sen Garcia is a 64 y.o. male.    Chief Complaint and history of present illness:     Follow-up for hypertension, bradycardia, vasovagal syncope     History of Present Illness:       Mr. Sen Garcia has PMH of     Hypertension  Bradycardia--all his life  History of vasovagal syncope 7/10/2018-while working [place]  Bladder cancer, papillary renal cell CA, surgery  Non-smoker  Intolerant to hydrochlorothiazide caused him to develop gout  Family history of diabetes and hypertension in mother        Here for follow-up.  Patient denies any chest pain or shortness of breath.  Patient was bradycardic therefore previously Nebivolol was decreased.  Continues to be bradycardic.  Denies any chest pain shortness of breath dizziness or loss of consciousness.    Patient's arterial blood pressure is 141/72, heart rate 45 bpm, O2 sat of 98% on room air.     Data:  EKG done 4/7/2021 reveals bradycardia at the rate of 50 bpm.  EKG from 5/17/2022 revealed EKG with sinus bradycardia at the rate of 51 bpm with LVH  Echocardiogram 7/14/2023 reveals EF of 60 to 65% with interatrial septum aneurysm.     Labs from 10/15/2021 reveal lipid profile with cholesterol 219, triglycerides 96, HDL 40, .  CMP 3/29/2023 was normal.  CBC from 5/24/2023 is normal.  Labs from 7/11/2023 revealed normal TSH at 0.875 with normal proBNP of 57.  Normal BMP.  Labs from 1/22/2024 reveal normal CBC.        Assessment:  :     Bradycardia  Hypertension  Dyslipidemia  Atrial septal aneurysm  History of papillary renal cell ca        Recommendations / Plan:         Reviewed EKG results with patient.  Patient is bradycardic.  Will discontinue Nebivolol, taper rate.  Continue amlodipine 10 and will increase lisinopril to 40 mg to control blood pressure  Reviewed labs with patient.  Cholesterol is improved.  Will check labs before next visit.  We will continue medical management with  amlodipine and lisinopril and pravastatin as tolerated.  Will follow-up in 1 year or sooner if needed             ECG 12 Lead    Date/Time: 7/31/2025 4:55 PM  Performed by: Jordan Garcia MD    Authorized by: Jordan Garcia MD  Comparison: compared with previous ECG from 7/29/2024  Comparison to previous ECG: EKG done today reviewed/interpreted by me reveals sinus bradycardia with rate of 45 bpm with nonspecific T wave inversion in the inferolateral leads.  No change compared to EKG from 7/29/2024          ECHOCARDIOGRAM:  Results for orders placed during the hospital encounter of 07/14/23    Adult Transthoracic Echo Complete W/ Cont if Necessary Per Protocol 07/17/2023  9:31 AM    Interpretation Summary    Estimated right ventricular systolic pressure from tricuspid regurgitation is normal (<35 mmHg).      Normal LV size and contractility EF of 60 to 65%  Normal RV size  Normal atrial size.  Interatrial septum is aneurysmal.  Pulmonic valve is not well visualized.  Mild pulmonary regurgitation seen.  Aortic valve, mitral valve, tricuspid valve appears structurally normal, trace mitral and tricuspid regurgitation seen.  Calculated RV systolic pressure of 25 mmHg.  No pericardial effusion seen.  Proximal aorta appears normal in size.      STRESS TEST  Results for orders placed during the hospital encounter of 07/09/18    Stress Test With Myocardial Perfusion One Day 07/10/2018 12:00 AM    Narrative  DATE OF EXAM:  07/10/2018    Indication:  Near-syncope, borderline elevated troponin    Procedure:    Patient gave informed consent and was stressed according to Lexiscan  protocol.  Lexiscan was given in amount of 0.4 mg over the interval of  18 seconds as per protocol.  Patient tolerated the infusion well.  Subsequently, radionuclide tracer was given intravenously.  Patient  complained of shortness of breath but no chest pain was reported.    EKG finding:  Baseline EKG showed normal sinus rhythm.   Nonspecific ST changes were  noted in inferior leads.  During Lexiscan infusion, no new changes  were noted.    Cardiolite imaging:    This was a one-day protocol.  Patient received 7.0 mCi of technetium  sestamibi at rest and 20.5 mCi of technetium sestamibi during Lexiscan  infusion patient underwent gated SPECT and SPECT imaging.    There was uniform distribution of radionuclide tracer throughout the  myocardium.  However distal anterior wall and apex shows small defect  which showed no reversibility and it appears to be attenuation  artifact.  Four DM SPECT imaging showed small apical defect which was  reversing but it was very small.  Gated SPECT imaging showed no wall  motion abnormality    Conclusion:  1. No myocardial ischemia.  2. No myocardial infarction.  3. Left ventricular ejection fraction on gated SPECT is 59%.  4. No segmental wall motion abnormality noted.  5. Further recommendation as per ordering physician.        Electronically Signed by Edward Escamilla M.D. on 07/12/2018 10:32:35  Edward Escamilla M.D.    SR/[place]  DD: 07/12/2018 10:32:26 DT:          HEART CATHETERIZATION  No results found for this or any previous visit.      Copied text in this portion of the note has been reviewed and is accurate as of 7/31/2025  The following portions of the patient's history were reviewed and updated as appropriate: allergies, current medications, past family history, past medical history, past social history, past surgical history and problem list.    Assessment:         MDM       Diagnosis Plan   1. Bradycardia  Comprehensive Metabolic Panel    Lipid Panel    TSH      2. Essential hypertension  Comprehensive Metabolic Panel    Lipid Panel    TSH      3. Dyslipidemia  Comprehensive Metabolic Panel    Lipid Panel    TSH             Plan:               Past Medical History:  Past Medical History:   Diagnosis Date    Cancer Not sure.    Bladder  and left kidney.    Dyslipidemia 2/6/2025    HTN (hypertension)     Near  "syncope     Sinus bradycardia      Past Surgical History:  Past Surgical History:   Procedure Laterality Date    HAND SURGERY Left     KIDNEY SURGERY      KNEE ARTHROSCOPY Right       Allergies:  No Known Allergies  Home Meds:  Current Meds:     Current Outpatient Medications:     amLODIPine (NORVASC) 10 MG tablet, Take 1 tablet by mouth Daily., Disp: 90 tablet, Rfl: 0    lisinopril (PRINIVIL,ZESTRIL) 40 MG tablet, Take 1 tablet by mouth Daily., Disp: 90 tablet, Rfl: 3    pravastatin (Pravachol) 40 MG tablet, Take 1 tablet by mouth Daily., Disp: 90 tablet, Rfl: 3    vitamin D (ERGOCALCIFEROL) 1.25 MG (71677 UT) capsule capsule, Take 1 capsule by mouth Every 7 (Seven) Days., Disp: , Rfl:   Social History:   Social History     Tobacco Use    Smoking status: Never     Passive exposure: Never    Smokeless tobacco: Never   Substance Use Topics    Alcohol use: Not Currently      Family History:  Family History   Problem Relation Age of Onset    Hypertension Mother     Diabetes Mother               Review of Systems   Cardiovascular:  Negative for chest pain, leg swelling and palpitations.   Respiratory:  Negative for shortness of breath.    Neurological:  Negative for dizziness and numbness.     All other systems are negative         Objective:     Physical Exam  /72 (BP Location: Left arm, Patient Position: Sitting, Cuff Size: Large Adult)   Pulse (!) 45   Ht 170.2 cm (67\")   Wt 82.1 kg (181 lb)   SpO2 98%   BMI 28.35 kg/m²   General:  Appears in no acute distress  Eyes: Sclera is anicteric,  conjunctiva is clear   HEENT:  No JVD.  No carotid bruits  Respiratory: Respirations regular and unlabored at rest.  Clear to auscultation  Cardiovascular: S1,S2 Regular rate and rhythm. .   Extremities: No digital clubbing or cyanosis, no edema  Skin: Color pink. Skin warm and dry to touch. No rashes  No xanthoma  Neuro: Alert and awake.    Lab Reviewed:         Jordan Garcia MD  7/31/2025 17:00 EDT      EMR " "Dragon/Transcription:   \"Dictated utilizing Dragon dictation\".        "